# Patient Record
Sex: MALE | Race: BLACK OR AFRICAN AMERICAN | Employment: FULL TIME | URBAN - METROPOLITAN AREA
[De-identification: names, ages, dates, MRNs, and addresses within clinical notes are randomized per-mention and may not be internally consistent; named-entity substitution may affect disease eponyms.]

---

## 2018-07-22 ENCOUNTER — APPOINTMENT (OUTPATIENT)
Dept: GENERAL RADIOLOGY | Age: 28
End: 2018-07-22

## 2018-07-22 ENCOUNTER — HOSPITAL ENCOUNTER (EMERGENCY)
Age: 28
Discharge: HOME | End: 2018-07-22

## 2018-07-22 VITALS
SYSTOLIC BLOOD PRESSURE: 147 MMHG | WEIGHT: 173 LBS | RESPIRATION RATE: 18 BRPM | HEIGHT: 67 IN | DIASTOLIC BLOOD PRESSURE: 82 MMHG | OXYGEN SATURATION: 98 % | HEART RATE: 75 BPM | BODY MASS INDEX: 27.15 KG/M2 | TEMPERATURE: 97.8 F

## 2018-07-22 DIAGNOSIS — R06.02 SHORTNESS OF BREATH: ICD-10-CM

## 2018-07-22 DIAGNOSIS — J45.901 EXACERBATION OF PERSISTENT ASTHMA, UNSPECIFIED ASTHMA SEVERITY: Primary | ICD-10-CM

## 2018-07-22 PROCEDURE — 94664 DEMO&/EVAL PT USE INHALER: CPT

## 2018-07-22 PROCEDURE — 94645 CONT INHLJ TX EACH ADDL HOUR: CPT

## 2018-07-22 PROCEDURE — 94644 CONT INHLJ TX 1ST HOUR: CPT

## 2018-07-22 PROCEDURE — 99284 EMERGENCY DEPT VISIT MOD MDM: CPT

## 2018-07-22 PROCEDURE — 6370000000 HC RX 637 (ALT 250 FOR IP): Performed by: NURSE PRACTITIONER

## 2018-07-22 PROCEDURE — 71046 X-RAY EXAM CHEST 2 VIEWS: CPT

## 2018-07-22 RX ORDER — PREDNISONE 20 MG/1
60 TABLET ORAL ONCE
Status: COMPLETED | OUTPATIENT
Start: 2018-07-22 | End: 2018-07-22

## 2018-07-22 RX ORDER — IPRATROPIUM BROMIDE AND ALBUTEROL SULFATE 2.5; .5 MG/3ML; MG/3ML
1 SOLUTION RESPIRATORY (INHALATION) ONCE
Status: COMPLETED | OUTPATIENT
Start: 2018-07-22 | End: 2018-07-22

## 2018-07-22 RX ORDER — PREDNISONE 10 MG/1
60 TABLET ORAL DAILY
Qty: 30 TABLET | Refills: 0 | Status: SHIPPED | OUTPATIENT
Start: 2018-07-22 | End: 2018-07-27

## 2018-07-22 RX ORDER — ALBUTEROL SULFATE 90 UG/1
2 AEROSOL, METERED RESPIRATORY (INHALATION) EVERY 4 HOURS PRN
Qty: 1 INHALER | Refills: 0 | Status: SHIPPED | OUTPATIENT
Start: 2018-07-22

## 2018-07-22 RX ADMIN — PREDNISONE 60 MG: 20 TABLET ORAL at 02:27

## 2018-07-22 RX ADMIN — IPRATROPIUM BROMIDE AND ALBUTEROL SULFATE 1 AMPULE: .5; 3 SOLUTION RESPIRATORY (INHALATION) at 02:00

## 2018-07-22 ASSESSMENT — ENCOUNTER SYMPTOMS
COUGH: 0
VOMITING: 0
ABDOMINAL PAIN: 0
NAUSEA: 0
WHEEZING: 1
SHORTNESS OF BREATH: 1
DIARRHEA: 0
BACK PAIN: 0
COLOR CHANGE: 0

## 2018-07-22 ASSESSMENT — PAIN DESCRIPTION - PAIN TYPE: TYPE: ACUTE PAIN

## 2018-07-22 ASSESSMENT — PAIN DESCRIPTION - LOCATION: LOCATION: BACK

## 2018-07-22 ASSESSMENT — PAIN SCALES - GENERAL: PAINLEVEL_OUTOF10: 7

## 2018-07-22 NOTE — ED PROVIDER NOTES
pain, cough or congestion. Cardiovascular: Negative for chest pain. Gastrointestinal: Negative for abdominal pain, diarrhea, nausea and vomiting. Musculoskeletal: Negative for back pain. Skin: Negative for color change. Neurological: Negative for weakness and headaches. Positives and Pertinent negatives as per HPI. Except as noted above in the ROS, problem specific ROS was completed and is negative. Physical Exam:  Physical Exam   Constitutional: He is oriented to person, place, and time. He appears well-developed and well-nourished. HENT:   Head: Normocephalic. Right Ear: External ear normal.   Left Ear: External ear normal.   Mouth/Throat: Oropharynx is clear and moist.   Eyes: Right eye exhibits no discharge. Left eye exhibits no discharge. No scleral icterus. Neck: Normal range of motion. Neck supple. Cardiovascular: Normal rate and intact distal pulses. Pulmonary/Chest: Effort normal. No respiratory distress. He has wheezes. Airway patent with symmetric rise and fall chest.  Patient has inspiratory wheezing in the posterior upper lobes. Right posterior base has very faint crackles. He is slightly tachypneic but has no dyspnea and saturations are 96% on room air. Abdominal: Soft. Bowel sounds are normal. There is no tenderness. Musculoskeletal: Normal range of motion. Neurological: He is alert and oriented to person, place, and time. GCS eye subscore is 4. GCS verbal subscore is 5. GCS motor subscore is 6. Skin: Skin is warm and dry. He is not diaphoretic. No pallor. Psychiatric: He has a normal mood and affect. His behavior is normal.   Nursing note and vitals reviewed.       MEDICAL DECISION MAKING    Vitals:    Vitals:    07/22/18 0146 07/22/18 0206   BP: (!) 154/90    Pulse: 74    Resp: 24 24   Temp: 97.8 °F (36.6 °C)    TempSrc: Oral    SpO2: 96% 97%   Weight: 173 lb (78.5 kg)    Height: 5' 7\" (1.702 m)        LABS: Labs Reviewed - No data to display     Remainder

## 2018-07-22 NOTE — ED NOTES
Pt ok to d/c to home. Pt given d/c instructions. Pt verbalized understating including Rx and follow up care. Pt ambulated to [facility] for ride home.  0 s/s of distress at time of d/c.          Todd Givens RN  07/22/18 4201

## 2021-04-12 ENCOUNTER — HOSPITAL ENCOUNTER (EMERGENCY)
Age: 31
Discharge: HOME | End: 2021-04-12
Attending: EMERGENCY MEDICINE
Payer: COMMERCIAL

## 2021-04-12 VITALS
OXYGEN SATURATION: 98 % | HEART RATE: 76 BPM | DIASTOLIC BLOOD PRESSURE: 79 MMHG | SYSTOLIC BLOOD PRESSURE: 139 MMHG | TEMPERATURE: 98.4 F | RESPIRATION RATE: 16 BRPM

## 2021-04-12 DIAGNOSIS — M43.6 TORTICOLLIS: ICD-10-CM

## 2021-04-12 DIAGNOSIS — M54.2 NECK PAIN: Primary | ICD-10-CM

## 2021-04-12 PROCEDURE — 6360000002 HC RX W HCPCS: Performed by: EMERGENCY MEDICINE

## 2021-04-12 PROCEDURE — 99284 EMERGENCY DEPT VISIT MOD MDM: CPT

## 2021-04-12 PROCEDURE — 6370000000 HC RX 637 (ALT 250 FOR IP): Performed by: EMERGENCY MEDICINE

## 2021-04-12 PROCEDURE — 96372 THER/PROPH/DIAG INJ SC/IM: CPT

## 2021-04-12 RX ORDER — NAPROXEN 500 MG/1
500 TABLET ORAL 2 TIMES DAILY WITH MEALS
Qty: 30 TABLET | Refills: 0 | Status: ON HOLD | OUTPATIENT
Start: 2021-04-12 | End: 2021-12-11 | Stop reason: HOSPADM

## 2021-04-12 RX ORDER — DIAZEPAM 5 MG/1
5 TABLET ORAL EVERY 8 HOURS PRN
Qty: 15 TABLET | Refills: 0 | Status: SHIPPED | OUTPATIENT
Start: 2021-04-12 | End: 2021-04-17

## 2021-04-12 RX ORDER — KETOROLAC TROMETHAMINE 30 MG/ML
30 INJECTION, SOLUTION INTRAMUSCULAR; INTRAVENOUS ONCE
Status: COMPLETED | OUTPATIENT
Start: 2021-04-12 | End: 2021-04-12

## 2021-04-12 RX ORDER — LIDOCAINE 4 G/G
1 PATCH TOPICAL DAILY
Status: DISCONTINUED | OUTPATIENT
Start: 2021-04-12 | End: 2021-04-12 | Stop reason: HOSPADM

## 2021-04-12 RX ORDER — DIAZEPAM 5 MG/1
5 TABLET ORAL ONCE
Status: COMPLETED | OUTPATIENT
Start: 2021-04-12 | End: 2021-04-12

## 2021-04-12 RX ADMIN — DIAZEPAM 5 MG: 5 TABLET ORAL at 19:05

## 2021-04-12 RX ADMIN — KETOROLAC TROMETHAMINE 30 MG: 30 INJECTION, SOLUTION INTRAMUSCULAR at 19:04

## 2021-04-12 ASSESSMENT — PAIN SCALES - GENERAL: PAINLEVEL_OUTOF10: 10

## 2021-04-12 NOTE — ED NOTES
Pt discharged to home. Pt given discharge instructions and verbalized understanding. Pt ambulatory at discharge and left without incident.       Ryan Pimentel, RN  04/12/21 5285

## 2021-04-12 NOTE — ED PROVIDER NOTES
[address] [facility] ENCOUNTER          ATTENDING PHYSICIAN NOTE       Date of evaluation: 4/12/2021    Chief Complaint     Neck Pain (sudden onset of neck pain x1 hr ago denies injury or trauma)      History of Present Illness     Maki Zaldivar is a 31 y.o. male with a PMH as described below who presents to the ED today with a chief complaint of: Who presents with left-sided neck pain. Patient states that happened about an hour ago. He states that this has happened before. He notes that he does a lot of heavy lifting for his job at work. He is supposed to work this evening. He complains of pain along the left side at the base of his skull that radiates down along his upper back and his left shoulder and down his back. He denies any anterior chest pain, trouble breathing, or midline back pain. He denies any weakness, numbness, or tingling radiating down his arm. He has not take any medication for the symptoms. He denies any right-sided symptoms. He denies any headache, dizziness, lightheadedness. The patientstates that there were no other associated signs and symptoms or modifying factors. Patient rates pain as 10/10. Review of Systems     As described above in the HPI otherwise all the systems reviewed and negative as reported by the patient. Past Medical, Surgical, Family, and Social History     He has a past medical history of Asthma. He has no past surgical history on file. His family history is not on file. He reports that he has been smoking cigarettes. He has never used smokeless tobacco. He reports that he does not drink alcohol. Medications     Previous Medications    ALBUTEROL SULFATE HFA (PROVENTIL HFA) 108 (90 BASE) MCG/ACT INHALER    Inhale 2 puffs into the lungs every 4 hours as needed for Wheezing or Shortness of Breath (Space out to every 6 hours as symptoms improve) Space out to every 6 hours as symptoms improve.        Allergies     He has No Known Allergies. Physical Exam     INITIAL VITALS: BP: 139/79, Temp: 98.4 °F (36.9 °C), Pulse: 76, Resp: 16, SpO2: 98 %   Physical Exam  Vitals signs and nursing note reviewed. Constitutional:       Appearance: Normal appearance. He is normal weight. HENT:      Head: Normocephalic and atraumatic. Mouth/Throat:      Mouth: Mucous membranes are moist.   Eyes:      Extraocular Movements: Extraocular movements intact. Pupils: Pupils are equal, round, and reactive to light. Neck:      Comments: Reproducible tenderness along the left occipital rim with palpable spasm of the left trapezius. Patient has full strength and sensation in his left upper extremity but does have exacerbation of pain with elevation of his left arm. There is additional left-sided paraspinal cervical tenderness. Pain with rotation of his head and flexion and extension of his neck. No nuchal rigidity or meningismus. Cardiovascular:      Rate and Rhythm: Normal rate and regular rhythm. Pulmonary:      Effort: Pulmonary effort is normal.      Breath sounds: Normal breath sounds. Musculoskeletal: Normal range of motion. General: Tenderness (Left-sided cervical tenderness as described above) present. No swelling. Skin:     Capillary Refill: Capillary refill takes less than 2 seconds. Neurological:      General: No focal deficit present. Mental Status: He is alert and oriented to person, place, and time. Mental status is at baseline. Psychiatric:         Mood and Affect: Mood normal.         DiagnosticResults     EKG       RADIOLOGY:  No orders to display       LABS:   No results found for this visit on 04/12/21. ED BEDSIDE ULTRASOUND:      RECENT VITALS:  BP: 139/79, Temp: 98.4 °F (36.9 °C),Pulse: 76, Resp: 16, SpO2: 98 %     Procedures         ED Course     Nursing Notes, Past Medical Hx, Past Surgical Hx, Social Hx, Allergies, and Family Hx werereviewed.     The patient was given thefollowing medications:  Orders Placed This Encounter   Medications    ketorolac (TORADOL) injection 30 mg    diazePAM (VALIUM) tablet 5 mg    lidocaine 4 % external patch 1 patch    naproxen (NAPROSYN) 500 MG tablet     Sig: Take 1 tablet by mouth 2 times daily (with meals)     Dispense:  30 tablet     Refill:  0    diazePAM (VALIUM) 5 MG tablet     Sig: Take 1 tablet by mouth every 8 hours as needed (muscle spasm) for up to 5 days. Dispense:  15 tablet     Refill:  0       CONSULTS:  None    MEDICAL DECISION MAKING / ASSESSMENT / Ginger Hyun is a 31 y.o. male who presents with reproducible left-sided neck pain. Symptoms are suspicious for torticollis likely secondary to trapezius spasm. He has reproducible pain in this distribution. He has no radicular features and is otherwise neurovascularly intact in his left upper extremity. He does heavy lifting for his job at work. No indication for cross-sectional imaging is no obvious or blunt trauma. He was given medications listed above with modest improvement in his symptoms. Ultimately I believe that he is safe for discharge home. I will discharge him home with a prescription for muscle relaxers, anti-inflammatory medication and he was instructed to use over-the-counter Lidoderm patches. He was given a short work note. Home stretching exercises provided. .        Clinical Impression     1. Neck pain    2. Torticollis        Disposition     PATIENT REFERRED TO:  Cassie Chen MD  [facility] [address] [phone]            DISCHARGE MEDICATIONS:  New Prescriptions    DIAZEPAM (VALIUM) 5 MG TABLET    Take 1 tablet by mouth every 8 hours as needed (muscle spasm) for up to 5 days.     NAPROXEN (NAPROSYN) 500 MG TABLET    Take 1 tablet by mouth 2 times daily (with meals)       DISPOSITION Discharge - Pending Orders Complete 04/12/2021 07:18:20 PM         Vanda Liu MD  04/12/21 0576

## 2021-12-09 ENCOUNTER — APPOINTMENT (OUTPATIENT)
Dept: CT IMAGING | Age: 31
DRG: 066 | End: 2021-12-09
Payer: COMMERCIAL

## 2021-12-09 ENCOUNTER — HOSPITAL ENCOUNTER (INPATIENT)
Age: 31
LOS: 1 days | Discharge: HOME | DRG: 066 | End: 2021-12-11
Attending: EMERGENCY MEDICINE | Admitting: INTERNAL MEDICINE
Payer: COMMERCIAL

## 2021-12-09 DIAGNOSIS — H81.4 CENTRAL POSITIONAL VERTIGO: ICD-10-CM

## 2021-12-09 DIAGNOSIS — R42 VERTIGO: Primary | ICD-10-CM

## 2021-12-09 LAB
BASOPHILS ABSOLUTE: 0.1 K/UL (ref 0–0.2)
BASOPHILS RELATIVE PERCENT: 1.5 %
EOSINOPHILS ABSOLUTE: 0.2 K/UL (ref 0–0.6)
EOSINOPHILS RELATIVE PERCENT: 5.2 %
HCT VFR BLD CALC: 47.7 % (ref 40.5–52.5)
HEMOGLOBIN: 15.9 G/DL (ref 13.5–17.5)
LYMPHOCYTES ABSOLUTE: 2 K/UL (ref 1–5.1)
LYMPHOCYTES RELATIVE PERCENT: 44.7 %
MCH RBC QN AUTO: 30.2 PG (ref 26–34)
MCHC RBC AUTO-ENTMCNC: 33.3 G/DL (ref 31–36)
MCV RBC AUTO: 90.7 FL (ref 80–100)
MONOCYTES ABSOLUTE: 0.5 K/UL (ref 0–1.3)
MONOCYTES RELATIVE PERCENT: 11.6 %
NEUTROPHILS ABSOLUTE: 1.7 K/UL (ref 1.7–7.7)
NEUTROPHILS RELATIVE PERCENT: 37 %
PDW BLD-RTO: 13.2 % (ref 12.4–15.4)
PLATELET # BLD: 284 K/UL (ref 135–450)
PLATELET SLIDE REVIEW: ADEQUATE
PMV BLD AUTO: 8.6 FL (ref 5–10.5)
RBC # BLD: 5.26 M/UL (ref 4.2–5.9)
WBC # BLD: 4.6 K/UL (ref 4–11)

## 2021-12-09 PROCEDURE — 96374 THER/PROPH/DIAG INJ IV PUSH: CPT

## 2021-12-09 PROCEDURE — 6360000002 HC RX W HCPCS: Performed by: EMERGENCY MEDICINE

## 2021-12-09 PROCEDURE — 85025 COMPLETE CBC W/AUTO DIFF WBC: CPT

## 2021-12-09 PROCEDURE — 70450 CT HEAD/BRAIN W/O DYE: CPT

## 2021-12-09 PROCEDURE — 70496 CT ANGIOGRAPHY HEAD: CPT

## 2021-12-09 PROCEDURE — 93005 ELECTROCARDIOGRAM TRACING: CPT

## 2021-12-09 PROCEDURE — 4A03X5D MEASUREMENT OF ARTERIAL FLOW, INTRACRANIAL, EXTERNAL APPROACH: ICD-10-PCS | Performed by: RADIOLOGY

## 2021-12-09 PROCEDURE — 2580000003 HC RX 258: Performed by: EMERGENCY MEDICINE

## 2021-12-09 PROCEDURE — 80053 COMPREHEN METABOLIC PANEL: CPT

## 2021-12-09 PROCEDURE — 99285 EMERGENCY DEPT VISIT HI MDM: CPT

## 2021-12-09 PROCEDURE — 96375 TX/PRO/DX INJ NEW DRUG ADDON: CPT

## 2021-12-09 RX ORDER — ONDANSETRON 2 MG/ML
4 INJECTION INTRAMUSCULAR; INTRAVENOUS ONCE
Status: COMPLETED | OUTPATIENT
Start: 2021-12-09 | End: 2021-12-09

## 2021-12-09 RX ORDER — MECLIZINE HCL 12.5 MG/1
12.5 TABLET ORAL 3 TIMES DAILY PRN
Status: ON HOLD | COMMUNITY
End: 2021-12-11 | Stop reason: HOSPADM

## 2021-12-09 RX ORDER — 0.9 % SODIUM CHLORIDE 0.9 %
1000 INTRAVENOUS SOLUTION INTRAVENOUS ONCE
Status: COMPLETED | OUTPATIENT
Start: 2021-12-09 | End: 2021-12-10

## 2021-12-09 RX ORDER — KETOROLAC TROMETHAMINE 30 MG/ML
30 INJECTION, SOLUTION INTRAMUSCULAR; INTRAVENOUS ONCE
Status: COMPLETED | OUTPATIENT
Start: 2021-12-10 | End: 2021-12-10

## 2021-12-09 RX ORDER — LORAZEPAM 2 MG/ML
0.5 INJECTION INTRAMUSCULAR ONCE
Status: COMPLETED | OUTPATIENT
Start: 2021-12-09 | End: 2021-12-09

## 2021-12-09 RX ADMIN — ONDANSETRON 4 MG: 2 INJECTION INTRAMUSCULAR; INTRAVENOUS at 23:21

## 2021-12-09 RX ADMIN — SODIUM CHLORIDE 1000 ML: 9 INJECTION, SOLUTION INTRAVENOUS at 23:30

## 2021-12-09 RX ADMIN — LORAZEPAM 0.5 MG: 2 INJECTION INTRAMUSCULAR; INTRAVENOUS at 23:21

## 2021-12-09 ASSESSMENT — PAIN DESCRIPTION - DESCRIPTORS: DESCRIPTORS: ACHING

## 2021-12-09 ASSESSMENT — PAIN DESCRIPTION - FREQUENCY: FREQUENCY: CONTINUOUS

## 2021-12-09 ASSESSMENT — PAIN DESCRIPTION - PAIN TYPE: TYPE: ACUTE PAIN

## 2021-12-09 ASSESSMENT — PAIN DESCRIPTION - LOCATION: LOCATION: HEAD;EYE

## 2021-12-09 ASSESSMENT — PAIN SCALES - GENERAL: PAINLEVEL_OUTOF10: 8

## 2021-12-09 NOTE — LETTER
[address] [facility] [address]  Phone: [phone]          December 11, 2021     Patient: Martinez South   YOB: 1990   Date of Visit: 12/9/2021       To Whom It May Concern: It is my medical opinion that Martinez South should remain out of work until follow up with neuro-ophthalmology for visual defect. .    If you have any questions or concerns, please don't hesitate to call.     Sincerely,    Coby Bran MD

## 2021-12-09 NOTE — LETTER
[address] [facility] [address]  Phone: [phone]          December 11, 2021     Patient: Aishwarya Edwards   YOB: 1990   Date of Visit: 12/9/2021       To Whom It May Concern: It is my medical opinion that Aishwarya Edwards should remain out of work until follow up with neuro-ophthalmology for visual defect. .    If you have any questions or concerns, please don't hesitate to call.     Sincerely,    Ez Burton MD

## 2021-12-10 ENCOUNTER — APPOINTMENT (OUTPATIENT)
Dept: MRI IMAGING | Age: 31
DRG: 066 | End: 2021-12-10
Payer: COMMERCIAL

## 2021-12-10 PROBLEM — R42 VERTIGO: Status: ACTIVE | Noted: 2021-12-10

## 2021-12-10 LAB
A/G RATIO: 1.4 (ref 1.1–2.2)
ALBUMIN SERPL-MCNC: 4.8 G/DL (ref 3.4–5)
ALP BLD-CCNC: 50 U/L (ref 40–129)
ALT SERPL-CCNC: 13 U/L (ref 10–40)
ANION GAP SERPL CALCULATED.3IONS-SCNC: 10 MMOL/L (ref 3–16)
AST SERPL-CCNC: 21 U/L (ref 15–37)
BILIRUB SERPL-MCNC: 0.3 MG/DL (ref 0–1)
BUN BLDV-MCNC: 10 MG/DL (ref 7–20)
CALCIUM SERPL-MCNC: 10.1 MG/DL (ref 8.3–10.6)
CHLORIDE BLD-SCNC: 101 MMOL/L (ref 99–110)
CHOLESTEROL, TOTAL: 177 MG/DL (ref 0–199)
CO2: 29 MMOL/L (ref 21–32)
CREAT SERPL-MCNC: 1.1 MG/DL (ref 0.9–1.3)
GFR AFRICAN AMERICAN: >60
GFR NON-AFRICAN AMERICAN: >60
GLUCOSE BLD-MCNC: 104 MG/DL (ref 70–99)
HDLC SERPL-MCNC: 36 MG/DL (ref 40–60)
LDL CHOLESTEROL CALCULATED: 119 MG/DL
LV EF: 58 %
LVEF MODALITY: NORMAL
POTASSIUM SERPL-SCNC: 3.9 MMOL/L (ref 3.5–5.1)
SARS-COV-2, NAAT: NOT DETECTED
SODIUM BLD-SCNC: 140 MMOL/L (ref 136–145)
TOTAL PROTEIN: 8.2 G/DL (ref 6.4–8.2)
TRIGL SERPL-MCNC: 111 MG/DL (ref 0–150)
VLDLC SERPL CALC-MCNC: 22 MG/DL

## 2021-12-10 PROCEDURE — 85613 RUSSELL VIPER VENOM DILUTED: CPT

## 2021-12-10 PROCEDURE — 6370000000 HC RX 637 (ALT 250 FOR IP): Performed by: NURSE PRACTITIONER

## 2021-12-10 PROCEDURE — 97530 THERAPEUTIC ACTIVITIES: CPT

## 2021-12-10 PROCEDURE — G0378 HOSPITAL OBSERVATION PER HR: HCPCS

## 2021-12-10 PROCEDURE — 83036 HEMOGLOBIN GLYCOSYLATED A1C: CPT

## 2021-12-10 PROCEDURE — 6370000000 HC RX 637 (ALT 250 FOR IP): Performed by: INTERNAL MEDICINE

## 2021-12-10 PROCEDURE — 97116 GAIT TRAINING THERAPY: CPT

## 2021-12-10 PROCEDURE — 1200000000 HC SEMI PRIVATE

## 2021-12-10 PROCEDURE — 97165 OT EVAL LOW COMPLEX 30 MIN: CPT

## 2021-12-10 PROCEDURE — APPNB60 APP NON BILLABLE TIME 46-60 MINS: Performed by: NURSE PRACTITIONER

## 2021-12-10 PROCEDURE — C8929 TTE W OR WO FOL WCON,DOPPLER: HCPCS

## 2021-12-10 PROCEDURE — 36415 COLL VENOUS BLD VENIPUNCTURE: CPT

## 2021-12-10 PROCEDURE — 81400 MOPATH PROCEDURE LEVEL 1: CPT

## 2021-12-10 PROCEDURE — 81241 F5 GENE: CPT

## 2021-12-10 PROCEDURE — 97162 PT EVAL MOD COMPLEX 30 MIN: CPT

## 2021-12-10 PROCEDURE — 86146 BETA-2 GLYCOPROTEIN ANTIBODY: CPT

## 2021-12-10 PROCEDURE — 80061 LIPID PANEL: CPT

## 2021-12-10 PROCEDURE — 96376 TX/PRO/DX INJ SAME DRUG ADON: CPT

## 2021-12-10 PROCEDURE — 81240 F2 GENE: CPT

## 2021-12-10 PROCEDURE — 6360000002 HC RX W HCPCS: Performed by: EMERGENCY MEDICINE

## 2021-12-10 PROCEDURE — 96375 TX/PRO/DX INJ NEW DRUG ADDON: CPT

## 2021-12-10 PROCEDURE — 86147 CARDIOLIPIN ANTIBODY EA IG: CPT

## 2021-12-10 PROCEDURE — 81291 MTHFR GENE: CPT

## 2021-12-10 PROCEDURE — 2580000003 HC RX 258: Performed by: INTERNAL MEDICINE

## 2021-12-10 PROCEDURE — 85598 HEXAGNAL PHOSPH PLTLT NEUTRL: CPT

## 2021-12-10 PROCEDURE — 85730 THROMBOPLASTIN TIME PARTIAL: CPT

## 2021-12-10 PROCEDURE — 87635 SARS-COV-2 COVID-19 AMP PRB: CPT

## 2021-12-10 PROCEDURE — 96374 THER/PROPH/DIAG INJ IV PUSH: CPT

## 2021-12-10 PROCEDURE — 96372 THER/PROPH/DIAG INJ SC/IM: CPT

## 2021-12-10 PROCEDURE — 6360000002 HC RX W HCPCS: Performed by: INTERNAL MEDICINE

## 2021-12-10 RX ORDER — ACETAMINOPHEN 650 MG/1
650 SUPPOSITORY RECTAL EVERY 6 HOURS PRN
Status: DISCONTINUED | OUTPATIENT
Start: 2021-12-10 | End: 2021-12-11 | Stop reason: HOSPADM

## 2021-12-10 RX ORDER — ONDANSETRON 4 MG/1
4 TABLET, ORALLY DISINTEGRATING ORAL EVERY 8 HOURS PRN
Status: DISCONTINUED | OUTPATIENT
Start: 2021-12-10 | End: 2021-12-11 | Stop reason: HOSPADM

## 2021-12-10 RX ORDER — SODIUM CHLORIDE 9 MG/ML
INJECTION, SOLUTION INTRAVENOUS CONTINUOUS
Status: DISCONTINUED | OUTPATIENT
Start: 2021-12-10 | End: 2021-12-10

## 2021-12-10 RX ORDER — SODIUM CHLORIDE 9 MG/ML
25 INJECTION, SOLUTION INTRAVENOUS PRN
Status: DISCONTINUED | OUTPATIENT
Start: 2021-12-10 | End: 2021-12-11 | Stop reason: HOSPADM

## 2021-12-10 RX ORDER — DIAZEPAM 5 MG/1
5 TABLET ORAL ONCE
Status: COMPLETED | OUTPATIENT
Start: 2021-12-10 | End: 2021-12-10

## 2021-12-10 RX ORDER — SODIUM CHLORIDE 0.9 % (FLUSH) 0.9 %
5-40 SYRINGE (ML) INJECTION EVERY 12 HOURS SCHEDULED
Status: DISCONTINUED | OUTPATIENT
Start: 2021-12-10 | End: 2021-12-11 | Stop reason: HOSPADM

## 2021-12-10 RX ORDER — MECLIZINE HYDROCHLORIDE 25 MG/1
12.5 TABLET ORAL 3 TIMES DAILY PRN
Status: DISCONTINUED | OUTPATIENT
Start: 2021-12-10 | End: 2021-12-11 | Stop reason: HOSPADM

## 2021-12-10 RX ORDER — POLYETHYLENE GLYCOL 3350 17 G/17G
17 POWDER, FOR SOLUTION ORAL DAILY PRN
Status: DISCONTINUED | OUTPATIENT
Start: 2021-12-10 | End: 2021-12-11 | Stop reason: HOSPADM

## 2021-12-10 RX ORDER — ACETAMINOPHEN 325 MG/1
650 TABLET ORAL EVERY 6 HOURS PRN
Status: DISCONTINUED | OUTPATIENT
Start: 2021-12-10 | End: 2021-12-11 | Stop reason: HOSPADM

## 2021-12-10 RX ORDER — ONDANSETRON 2 MG/ML
4 INJECTION INTRAMUSCULAR; INTRAVENOUS EVERY 6 HOURS PRN
Status: DISCONTINUED | OUTPATIENT
Start: 2021-12-10 | End: 2021-12-11 | Stop reason: HOSPADM

## 2021-12-10 RX ORDER — SODIUM CHLORIDE 0.9 % (FLUSH) 0.9 %
5-40 SYRINGE (ML) INJECTION PRN
Status: DISCONTINUED | OUTPATIENT
Start: 2021-12-10 | End: 2021-12-11 | Stop reason: HOSPADM

## 2021-12-10 RX ADMIN — ENOXAPARIN SODIUM 40 MG: 100 INJECTION SUBCUTANEOUS at 09:22

## 2021-12-10 RX ADMIN — DIAZEPAM 5 MG: 5 TABLET ORAL at 12:11

## 2021-12-10 RX ADMIN — ONDANSETRON 4 MG: 4 TABLET, ORALLY DISINTEGRATING ORAL at 12:11

## 2021-12-10 RX ADMIN — ONDANSETRON 4 MG: 2 INJECTION INTRAMUSCULAR; INTRAVENOUS at 18:39

## 2021-12-10 RX ADMIN — ASPIRIN 325 MG: 325 TABLET, COATED ORAL at 13:43

## 2021-12-10 RX ADMIN — SODIUM CHLORIDE, PRESERVATIVE FREE 10 ML: 5 INJECTION INTRAVENOUS at 21:33

## 2021-12-10 RX ADMIN — KETOROLAC TROMETHAMINE 30 MG: 30 INJECTION, SOLUTION INTRAMUSCULAR; INTRAVENOUS at 00:17

## 2021-12-10 RX ADMIN — SODIUM CHLORIDE: 9 INJECTION, SOLUTION INTRAVENOUS at 07:17

## 2021-12-10 ASSESSMENT — PAIN SCALES - GENERAL
PAINLEVEL_OUTOF10: 0
PAINLEVEL_OUTOF10: 5
PAINLEVEL_OUTOF10: 0
PAINLEVEL_OUTOF10: 0

## 2021-12-10 NOTE — PROGRESS NOTES
Occupational Therapy   Occupational Therapy Initial Assessment and Tx  Date: 12/10/2021   Patient Name: Gil Brown  MRN: [id number]     : 1990    Date of Service: 12/10/2021    Discharge Recommendations:  Gil Brown scored a 19/24 on the AM-PAC ADL Inpatient form. Current research shows that an AM-PAC score of 18 or greater is typically associated with a discharge to the patient's home setting. Based on the patient's AM-PAC score, and their current ADL deficits, it is recommended that the patient have 2-3 sessions per week of Occupational Therapy at d/c to increase the patient's independence. At this time, this patient demonstrates the endurance and safety to discharge home with (home vs OP services) and a follow up treatment frequency of 2-3x/wk. Please see assessment section for further patient specific details. If patient discharges prior to next session this note will serve as a discharge summary. Please see below for the latest assessment towards goals. OT Equipment Recommendations  Equipment Needed: No    Assessment   Performance deficits / Impairments: Decreased functional mobility ; Decreased ADL status; Decreased endurance; Decreased balance  Assessment: Pt from home, prev IND, admit with vertigo. Pt with significantly decreased balance. Pt multiple LOB this date 2-3x, Carolann x2 to [address] [place] to correct. Completing bed to chair, steps fwd and back from recliner 6-8 steps w/o AD. Significant difficulty with turning. Pt limited eval 2/2 balance and vertigo. Completing ADLs seated in recliner chair. Nystagmus noted at rest, noted with visual tracking. Would benefit from cont OT while lesli acute care.   Treatment Diagnosis: impaired mobility, transfers, ADL  Decision Making: Low Complexity  OT Education: OT Role; Plan of Care; ADL Adaptive Strategies; Transfer Training  Patient Education: verb understanding  Barriers to Learning: none  REQUIRES OT FOLLOW UP: Yes  Activity Tolerance  Activity Tolerance: Treatment limited secondary to medical complications (free text)  Activity Tolerance: limited by vertigo  Safety Devices  Safety Devices in place: Yes  Type of devices: All fall risk precautions in place; Call light within reach; Chair alarm in place; Gait belt; Patient at risk for falls; Left in chair; Nurse notified           Patient Diagnosis(es): The encounter diagnosis was Vertigo. has a past medical history of Asthma and Nystagmus. has no past surgical history on file. Treatment Diagnosis: impaired mobility, transfers, ADL      Restrictions  Position Activity Restriction  Other position/activity restrictions: up as tolerated, up with assist, fall precautions    Subjective   General  Chart Reviewed: Yes  Additional Pertinent Hx: 36 y.o. male who presents via ambulance to the ED with complaints of pain is described as a spinning sensation and feels off balance. He states that it came on abruptly Tuesday night. Referring Practitioner: Lydia Franco MD  Diagnosis: vertigo  Subjective  Subjective:  In bed upon arrival, agreeable to session  Patient Currently in Pain: Denies  Vital Signs  Level of Consciousness: Alert (0)  Patient Currently in Pain: Denies  Social/Functional History  Social/Functional History  Lives With: Family (mom, post CVA but gets around good now)  Type of Home: Apartment  Home Layout: One level  Home Access: Stairs to enter with rails  Entrance Stairs - Number of Steps: 3+3  Entrance Stairs - Rails: Right  Bathroom Shower/Tub: Tub/Shower unit  Bathroom Toilet: Standard  ADL Assistance: Independent  Homemaking Assistance: Independent  Homemaking Responsibilities: Yes  Ambulation Assistance: Independent  Transfer Assistance: Independent  Active : No  Patient's  Info: takes bus  Occupation: Full time employment  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 pt reports seeing double vision from 2 ft away  Balance  Sitting Balance: Supervision  Standing Balance: Minimal assistance  Standing Balance  Activity: bed to chair, amb fwd and back from chair 6-8 steps no AD  Functional Mobility  Functional - Mobility Device: No device  Activity: Other  Functional Mobility Comments: Carolann x2 to [address] [place] x1 ambulating, LOB 2-3x.  Vertigo, multiple LOB, unsteady  ADL  Grooming: Setup; Stand by assistance (seated in recliner chair, wipe face, oral care)  LE Dressing: Stand by assistance (jemma socks seated EOB)  Toileting:  (declined need)        Bed mobility  Supine to Sit: Stand by assistance  Scooting: Stand by assistance  Transfers  Sit to stand: Contact guard assistance  Stand to sit: Contact guard assistance     Cognition  Overall Cognitive Status: WNL                 LUE AROM (degrees)  LUE AROM : WFL  Left Hand AROM (degrees)  Left Hand AROM: WFL  RUE AROM (degrees)  RUE AROM : WFL  Right Hand AROM (degrees)  Right Hand AROM: WFL  LUE Strength  Gross LUE Strength: WFL  RUE Strength  Gross RUE Strength: WFL                   Plan   Plan  Times per week: 2-5  Times per day: Daily      AM-PAC Score        AM-[facility] Inpatient Daily Activity Raw Score: 19 (12/10/21 1330)  AM-PAC Inpatient ADL T-Scale Score : 40.22 (12/10/21 1330)  ADL Inpatient CMS 0-100% Score: 42.8 (12/10/21 1330)  ADL Inpatient CMS G-Code Modifier : CK (12/10/21 1330)    Goals  Short term goals  Time Frame for Short term goals: dc  Short term goal 1: supine to sit IND  Short term goal 2: sit<>stand SPVN  Short term goal 3: Fx mobility SBA w/ LRAD  Short term goal 4: LB dressing SPVN  Short term goal 5: tolerate toileting assessment       Therapy Time   Individual Concurrent Group Co-treatment   Time In 1110 Time Out 1135         Minutes 25              Timed Code Treatment Minutes:   15    Total Treatment Minutes:  [address], OT

## 2021-12-10 NOTE — CARE COORDINATION
CM following for  D/c planning: patient from home w/ spouse  indep at  Baseline pta:  Presented  With Vertigo difficulty walking imbalance: No current services or DME  . Neurosurgery consulted    Work up in process:    Consult placed to PMR:    CM will cont to follow:    Electronically signed by Jt Espinoza RN on 12/10/2021 at 1:27 PM         Jt Espinoza RN Case Manager  [facility]  [address] [place] [address].   [facility] [address] [phone]  Fax [phone]

## 2021-12-10 NOTE — H&P
Hospital Medicine History & Physical      PCP: Alfred Valdes MD    Date of Admission: 12/9/2021    Date of Service: Pt seen/examined on 12/10/2021 and Admitted to Inpatient with expected LOS greater than two midnights due to medical therapy needed for acute vertiginous state, at high risk for complications. Placed in Observation. Chief Complaint:    Dizziness    History Of Present Illness:   Pt s a 37 yo male w/ no significant pmh who presents w/ a two day hx of sudden onset of dizziness, lightheadedness, and room spinning sensation. Pt reports nausea w/o vomiting. Denies headache. No aggravating factors- whether lying flat or moving pt notes no difference in sx. In the ed he received ativan which he felt helped. He also reports improvement when he looks to his far right. Denies chest pain/ sob/ headache. No focal weakness. No recent trauma. No recent illness. No fever/ chills/ cough/ congestion. Past Medical History:          Diagnosis Date    Asthma     Nystagmus      Past Surgical History:      History reviewed. No pertinent surgical history. Medications Prior to Admission:      Prior to Admission medications    Medication Sig Start Date End Date Taking? Authorizing Provider   meclizine (ANTIVERT) 12.5 MG tablet Take 12.5 mg by mouth 3 times daily as needed   Yes Historical Provider, MD   naproxen (NAPROSYN) 500 MG tablet Take 1 tablet by mouth 2 times daily (with meals) 4/12/21   Sydnee Vincent MD   albuterol sulfate HFA (PROVENTIL HFA) 108 (90 Base) MCG/ACT inhaler Inhale 2 puffs into the lungs every 4 hours as needed for Wheezing or Shortness of Breath (Space out to every 6 hours as symptoms improve) Space out to every 6 hours as symptoms improve.  7/22/18   Corinne Maurer, TRA - CNP       Allergies:  Shellfish-derived products and Percocet [oxycodone-acetaminophen]    Social History:      The patient currently lives at home    TOBACCO:   reports that he has been smoking cigarettes. He has never used smokeless tobacco.  ETOH:   reports no history of alcohol use. E-Cigarettes/Vaping Use     Questions Responses    E-Cigarette/Vaping Use Never User    Start Date     Passive Exposure     Quit Date     Counseling Given     Comments             Family History:    CAD- mother  CVA- paternal grandmother  DM2- sibling    REVIEW OF SYSTEMS COMPLETED:   Pertinent positives as noted in the HPI. All other systems reviewed and negative. PHYSICAL EXAM PERFORMED:    /69   Pulse 75   Temp 97.8 °F (36.6 °C) (Oral)   Resp 18   Ht 5' 7\" (1.702 m)   Wt 189 lb (85.7 kg)   SpO2 94%   BMI 29.60 kg/m²     General appearance:  Appears in discomfort 2/2 dizziness  HEENT:  Left lateral nystagmus noted, eomi  Neck: Supple, with full range of motion. Respiratory:  Normal respiratory effort. Clear to auscultation, bilaterally without Rales/Wheezes/Rhonchi. Cardiovascular:  Regular rate and rhythm with normal S1/S2 without murmurs, rubs or gallops. Abdomen: Soft, non-tender  Musculoskeletal:  No clubbing, cyanosis or edema bilaterally. Full range of motion without deformity. Skin: Skin color, texture, turgor normal.  No rashes or lesions. Neurologic:  Neurovascularly intact without any focal sensory/motor deficits. Cranial nerves: II-XII intact, grossly non-focal.  Psychiatric:  Alert and oriented, thought content appropriate, normal insight  Capillary Refill: Brisk,3 seconds, normal  Peripheral Pulses: +2 palpable, equal bilaterally     Labs:     Recent Labs     12/09/21  2329   WBC 4.6   HGB 15.9   HCT 47.7        Recent Labs     12/09/21  2329      K 3.9      CO2 29   BUN 10   CREATININE 1.1   CALCIUM 10.1     Recent Labs     12/09/21  2329   AST 21   ALT 13   BILITOT 0.3   ALKPHOS 50     Radiology:       CTA HEAD NECK W CONTRAST   Final Result      CTA Head:   No significant arterial steno-occlusive disease or evidence of aneurysm.       CTA Neck:   No significant arterial steno-occlusive disease or evidence of dissection. CT HEAD WO CONTRAST   Final Result      No acute intracranial hemorrhage or mass effect          ASSESSMENT and PLAN:    Active Hospital Problems    Diagnosis Date Noted    Vertigo [R42] 12/10/2021   w/ left lateral nystagmus. Peripheral causes include bppv, meniere's, but need to exclude central causes. Thus far cta head and neck negative. Will proceed w/ mri of the brain and will consult neuro for further eval.  PT/OT eval.  Meclizine ineffective. Trial of valium. DVT Prophylaxis: Lovenox  Diet: ADULT DIET; Regular  Code Status: Full Code    PT/OT Eval Status: Wade Meyer MD       Thank you Regla Sue MD for the opportunity to be involved in this patient's care.

## 2021-12-10 NOTE — ED NOTES
Pt sleeping, arouses easily to verbal command, Aware ETA approx 3000 [facility], RN  12/10/21 7739

## 2021-12-10 NOTE — CONSULTS
Hematology Consult Note  PGY-2    PCP: Laurinda Hatchet, MD    Code:Full Code  Admit Date: 12/9/2021  Diet: ADULT DIET; Regular      History of present illness:      CC: Mila    Patient is a 36 y.o. male with a no significant past medical history who presents with acute onset of difficultly walking which developed four days ago. Associated with this he felt really nauseated and light-headed. He was dropping things on the ground frequently. He went to see the [facility] health nurse. He vomited on his way home.      He was sent to [facility] on Tuesday morning, and was diagnosed with vertigo. He slept all day. His symptoms are constant, but the queasiness comes and goes. The vertigo is unchanged since it began. This has never happened to him before.      He denies any diplopia but reports associated symptoms of blurry vision. He tells me if he looks to the right, it doesn't spin. He reports neck pain for the last month in his left posterior neck. No weakness, dysphagia, paresthesias.       He denies a history of hypertension. Does not take an antiplatelet at home. He denies a history of hyperlipidemia or diabetes, heart failure, DVT/PE. He smokes occasionally on weekends. He has not been vaccinated for COVID-19. He reports having COVID-19 about a month ago. ROS: Review of Systems -   All other systems reviewed and are negative. Past Medical / Surgical History:    Past Medical History:   Diagnosis Date    Asthma     Nystagmus      History reviewed. No pertinent surgical history. Medications Prior to Admission:    No current facility-administered medications on file prior to encounter.      Current Outpatient Medications on File Prior to Encounter   Medication Sig Dispense Refill    meclizine (ANTIVERT) 12.5 MG tablet Take 12.5 mg by mouth 3 times daily as needed      naproxen (NAPROSYN) 500 MG tablet Take 1 tablet by mouth 2 times daily (with meals) 30 tablet 0    albuterol sulfate HFA (PROVENTIL HFA) 108 (90 Base) MCG/ACT inhaler Inhale 2 puffs into the lungs every 4 hours as needed for Wheezing or Shortness of Breath (Space out to every 6 hours as symptoms improve) Space out to every 6 hours as symptoms improve. 1 Inhaler 0       Allergies:  Shellfish-derived products and Percocet [oxycodone-acetaminophen]    Social History:   TOBACCO:   reports that he has been smoking cigarettes. He has never used smokeless tobacco.       ETOH:   reports no history of alcohol use. Family History:   History reviewed. No pertinent family history. Vital/I&O/Physical examination:   VS:  /81   Pulse 67   Temp 98.5 °F (36.9 °C) (Oral)   Resp 18   Ht 5' 7\" (1.702 m)   Wt 189 lb (85.7 kg)   SpO2 95%   BMI 29.60 kg/m²     I/O:    Intake/Output Summary (Last 24 hours) at 12/10/2021 1800  Last data filed at 12/10/2021 1228  Gross per 24 hour   Intake 240 ml   Output 600 ml   Net -360 ml       PE:  Physical Exam  Vitals reviewed. Constitutional:       Comments: Appeared discomfort secondary to dizziness   HENT:      Head: Normocephalic and atraumatic. Nose: Nose normal.      Mouth/Throat:      Mouth: Mucous membranes are moist.   Eyes:      Extraocular Movements: Extraocular movements intact. Conjunctiva/sclera: Conjunctivae normal.      Pupils: Pupils are equal, round, and reactive to light. Cardiovascular:      Rate and Rhythm: Normal rate and regular rhythm. Pulses: Normal pulses. Heart sounds: Normal heart sounds. Pulmonary:      Effort: Pulmonary effort is normal.      Breath sounds: Normal breath sounds. Abdominal:      Palpations: Abdomen is soft. Musculoskeletal:         General: Normal range of motion. Cervical back: Normal range of motion and neck supple. Neurological:      General: No focal deficit present. Mental Status: He is alert and oriented to person, place, and time.            Labs & Imaging:   LABS:  CBC:   Recent Labs     12/09/21  2329   WBC 4.6   HGB 15.9   HCT 47.7      MCV 90.7                            Renal:   Recent Labs     12/09/21  2329      K 3.9      CO2 29   BUN 10   CREATININE 1.1   GLUCOSE 104*   ANIONGAP 10     Hepatic:   Recent Labs     12/09/21  2329   AST 21   ALT 13   BILITOT 0.3   ALKPHOS 50     Troponin: No results for input(s): TROPONINI in the last 72 hours. BNP: No results for input(s): BNP in the last 72 hours. Lipids:   Recent Labs     12/10/21  1310   CHOL 177   HDL 36*     INR: No results for input(s): INR in the last 72 hours. Lactate: No results for input(s): LACTATE in the last 72 hours. ABGs:No results for input(s): PHART, MXW4WDT, PO2ART, QGJ2LPH, BEART, THGBART, Z1EXQBQO, EVY2VNP in the last 72 hours. UA:No results for input(s): NITRITE, COLORU, PHUR, LABCAST, WBCUA, RBCUA, MUCUS, TRICHOMONAS, YEAST, BACTERIA, CLARITYU, SPECGRAV, LEUKOCYTESUR, UROBILINOGEN, BILIRUBINUR, BLOODU, GLUCOSEU, AMORPHOUS in the last 72 hours. Invalid input(s): Yue Becker     IMAGING:  CTA HEAD NECK W CONTRAST   Final Result      CTA Head:   No significant arterial steno-occlusive disease or evidence of aneurysm. CTA Neck:   No significant arterial steno-occlusive disease or evidence of dissection. CT HEAD WO CONTRAST   Final Result      No acute intracranial hemorrhage or mass effect      MRI BRAIN WO CONTRAST    (Results Pending)       Assessment & Plan:    Jim Flores is a 36 y.o. male with PMHx significant for no significant past medical history who presents with acute onset of difficultly walking which developed four days ago. Vertigo:  - Pt presented with sudden onset ataxia, nystagmus and left visual field cut.  - He has no family history of stroke. - Symptoms suggestive of posterior circulation stroke.    - However, CT and CTA of the head no acute intracranial hemorrhage or no significant arterial steno-occlusive disease or evidence of dissection.  - Echo showed normal systolic and diastolic function and bubble study was performed and fails to show evidence of right to left shunting.  - Pt had recent Covid infection which will put him at risk of clotting.  - Agree with MRI. - Will follow up on MRI and make further recommendations. - Lovenox for DVT prophylaxis         Code Status: Full Code  ADULT DIET;  Regular  PPX:Lovenox       This patient will be discussed with attending, Dr. Malcom March MD.    Viviana Bullard MD, PGY- 2  Contact via MyVR  12/10/2021,  6:00 PM

## 2021-12-10 NOTE — CONSULTS
Neurology Consult Note  Reason for Consult: vertigo - central vs. Peripheral   Chief complaint: vertigo   Dr Heide Kocher, MD asked me to see Stephen Rashid in consultation for evaluation of vertigo    History of Present Illness:  Stephen Rashid is a 36 y.o. male who presents with acute onset of difficultly walking which developed four days ago. Associated with this he felt really nauseated and light-headed. He was dropping things on the ground frequently. He went to see the employee health nurse. He vomited on his way home. He was sent to [facility] on Tuesday morning, and was diagnosed with vertigo. He slept all day. His symptoms are constant, but the queasiness comes and goes. The vertigo is unchanged since it began. This has never happened to him before. He denies any diplopia but reports associated symptoms of blurry vision. He tells me if he looks to the right, it doesn't spin. He reports neck pain for the last month in his left posterior neck. No weakness, dysphagia, paresthesias. He denies a history of hypertension. Does not take an antiplatelet at home. He denies a history of hyperlipidemia or diabetes, heart failure, DVT/PE. He smokes occasionally on weekends. He has not been vaccinated for COVID-19. He reports having COVID-19 about a month ago. His mom had a stroke in her 25s. Medical History:  Past Medical History:   Diagnosis Date    Asthma     Nystagmus      History reviewed. No pertinent surgical history.   Medications Prior to Admission:   meclizine (ANTIVERT) 12.5 MG tablet, Take 12.5 mg by mouth 3 times daily as needed  naproxen (NAPROSYN) 500 MG tablet, Take 1 tablet by mouth 2 times daily (with meals)  albuterol sulfate HFA (PROVENTIL HFA) 108 (90 Base) MCG/ACT inhaler, Inhale 2 puffs into the lungs every 4 hours as needed for Wheezing or Shortness of Breath (Space out to every 6 hours as symptoms improve) Space out to every 6 hours as symptoms improve. Allergies   Allergen Reactions    Shellfish-Derived Products Anaphylaxis    Percocet [Oxycodone-Acetaminophen]      History reviewed. No pertinent family history. Social History     Tobacco Use   Smoking Status Current Some Day Smoker    Types: Cigarettes   Smokeless Tobacco Never Used     Social History     Substance and Sexual Activity   Drug Use Never     Social History     Substance and Sexual Activity   Alcohol Use No       ROS:  Constitutional- No weight loss or fevers  Eyes- No diplopia. No photophobia. Ears/nose/throat- No dysphagia. No Dysarthria  Cardiovascular- No palpitations. + chest pain  Respiratory- + dyspnea. No Cough  Gastrointestinal- + Abdominal pain. No Vomiting. Genitourinary- No incontinence. No urinary retention  Musculoskeletal- No myalgia. No arthralgia  Skin- No rash. No easy bruising. Psychiatric- No depression. No anxiety  Endocrine- No diabetes. No thyroid issues. Hematologic- No bleeding difficulty. No fatigue  Neurologic- No weakness. + Headache. Exam:  Blood pressure 117/75, pulse 64, temperature 97.2 °F (36.2 °C), temperature source Oral, resp. rate 16, height 5' 7\" (1.702 m), weight 189 lb (85.7 kg), SpO2 97 %. Constitutional    Vital signs: BP, HR, and RR reviewed   General Alert, no distress, well-nourished  Eyes: unable to visualize fundi   Cardiovascular: pulses symmetric in all 4 extremities. No peripheral edema. Psychiatric: cooperative with examination, no  psychotic behavior noted.   Neurologic  Mental status: eyes open spontaneously   orientation to person and place, month, and year   General fund of knowledge grossly intact; names current president   Memory grossly intact   Attention intact as able to attend well to the exam; unable to read clock given blurry vision    Language fluent in conversation   Comprehension intact; follows simple commands and 2 step commands crossing midline  Cranial nerves:    CN2: LEFT homonymous hemianopsia   CN 3,4,6: copy of this note was provided for Dr Too Castro MD.    Jroge Lorenzana NP  Neurology and Neurocritical care  [facility] Neurology line: [phone]  [employer]: [facility] Neurology & Neurocritical Care NPs    I spent 60 minutes in the care of this patient. Over 50% of that time was in face-to-face counseling regarding disease process, diagnostic testing, preventative measures, and answering patient and family questions.

## 2021-12-10 NOTE — ED TRIAGE NOTES
30Y/O male presents to the ED for vertigo. Pt states that he was seen at [address] [place] [address] for the same and was given Meclizine and Zofran. Pt states minimal relief. Pt also states he has had several falls, forwards and backwards and denies any injury.  +nausea + bilateral eye pain + head pain

## 2021-12-10 NOTE — PROGRESS NOTES
Physical Therapy    Facility/Department: [address]  Initial Assessment    NAME: Mckenzie Pollard  : 1990  MRN: [id number]    Date of Service: 12/10/2021    Discharge Recommendations: Mckenzie Pollard scored a 16/24 on the AM-PAC short mobility form. Current research shows that an AM-PAC score of 18 or greater is typically associated with a discharge to the patient's home setting. Based on the patient's AM-PAC score and their current functional mobility deficits, it is recommended that the patient have 2-3 sessions per week of Physical Therapy at d/c to increase the patient's independence. At this time, this patient demonstrates the endurance and safety to discharge home with OP PT for vertigo and a follow up treatment frequency of 2-3x/wk. Please see assessment section for further patient specific details. Pt symptoms of dizziness are causing instability with gait, if symptoms resolve pt will be safe to return home. If patient discharges prior to next session this note will serve as a discharge summary. Please see below for the latest assessment towards goals. PT Equipment Recommendations  Equipment Needed: No    Assessment   Body structures, Functions, Activity limitations: Decreased functional mobility ; Decreased endurance; Decreased safe awareness; Decreased balance  Assessment: pt is a 37 yo male presenting with vertigo presenting below baseline due to dizziness and double vision, pt is requiring assist of 2 for stand pivot transfers and ambulation due to LOB and unsteady gait. pt experienced 2 LOB with short distance ambulation requiring heavy assist to correct. pt demonstrating significant nystagmus at rest and with eye movements to the L. pt will benefit from OP PT to address vertigo symptoms. will follow throughout stay  Treatment Diagnosis: dec functional mobility  Prognosis: Good  Decision Making: Medium Complexity  PT Education: Goals; PT Role; Plan of Care;  Functional AM-PAC Score  AM-PAC Inpatient Mobility Raw Score : 16 (12/10/21 1322)  AM-PAC Inpatient T-Scale Score : 40.78 (12/10/21 1322)  Mobility Inpatient CMS 0-100% Score: 54.16 (12/10/21 1322)  Mobility Inpatient CMS G-Code Modifier : CK (12/10/21 1322)          Goals  Short term goals  Time Frame for Short term goals: by dc  Short term goal 1: pt will perform functional transfers with indep  Short term goal 2: pt will ambulate 150' without LOB and with indep  Short term goal 3: pt will negotiate 6 steps with 1 HR and Sup  Patient Goals   Patient goals : to go home       Therapy Time   Individual Concurrent Group Co-treatment   Time In 1110         Time Out 1135         Minutes 25              Timed Code Treatment Minutes:  10 min     Total Treatment Minutes:  25 min       Allison Mendoza, PT

## 2021-12-10 NOTE — ED NOTES
Patient got out of bed slowly, dangled legs on bedside. Pt off balance when stood up, swaying when took a few steps.  Placed back on Javier Narayanan, RN  12/10/21 6132

## 2021-12-10 NOTE — ED PROVIDER NOTES
[facility]  EMERGENCY DEPT VISIT      Patient Identification  Mckenzie Pollard is a 36 y.o. male. Chief Complaint   Dizziness      History of Present Illness: This is a  36 y.o. male who presents via ambulance to the ED with complaints of pain is described as a spinning sensation and feels off balance. He states that it came on abruptly Tuesday night. He was at work and started having trouble walking and was stumbling and losing his balance but did not fall. He was seen by the nurse at work and sent to the emergency department. Patient was seen at [facility] ED where blood work and EKG were relatively unremarkable. He was treated for vertigo with Antivert and Zofran. He did not get much relief from this medication but was feeling well enough and ambulating well enough to go home. Patient states the dizziness has waxed and waned since. There was a few hours that he felt like he could walk okay but it got worse again this evening. He tried taking the meclizine at 4pm but felt like the dizziness got worse within 10 minutes of taking it and hs vomiting recurred. He has mild headache mostly behind left eye. He reports some blurred vision at certain distances which is new. No diplopia. No numbness or weakness. No fever. No sinus congestion, sore throat, cough, ear pain, tinnitus. He has been having left sided posterior neck pain for a few months. No trauma to neck. No recent chiropractic manipulation. Past Medical History:   Diagnosis Date    Asthma     Nystagmus        History reviewed. No pertinent surgical history. No current facility-administered medications for this encounter.     Current Outpatient Medications:     meclizine (ANTIVERT) 12.5 MG tablet, Take 12.5 mg by mouth 3 times daily as needed, Disp: , Rfl:     naproxen (NAPROSYN) 500 MG tablet, Take 1 tablet by mouth 2 times daily (with meals), Disp: 30 tablet, Rfl: 0    albuterol sulfate HFA (PROVENTIL HFA) 108 (90 Base) MCG/ACT inhaler, Inhale 2 puffs into the lungs every 4 hours as needed for Wheezing or Shortness of Breath (Space out to every 6 hours as symptoms improve) Space out to every 6 hours as symptoms improve., Disp: 1 Inhaler, Rfl: 0    Allergies   Allergen Reactions    Percocet [Oxycodone-Acetaminophen]        Social History     Socioeconomic History    Marital status:      Spouse name: Not on file    Number of children: Not on file    Years of education: Not on file    Highest education level: Not on file   Occupational History    Not on file   Tobacco Use    Smoking status: Current Some Day Smoker     Types: Cigarettes    Smokeless tobacco: Never Used   Vaping Use    Vaping Use: Never used   Substance and Sexual Activity    Alcohol use: No    Drug use: Never    Sexual activity: Not on file   Other Topics Concern    Not on file   Social History Narrative    Not on file     Social Determinants of Health     Financial Resource Strain:     Difficulty of Paying Living Expenses: Not on file   Food Insecurity:     Worried About Running Out of Food in the Last Year: Not on file    Shanna of Food in the Last Year: Not on file   Transportation Needs:     Lack of Transportation (Medical): Not on file    Lack of Transportation (Non-Medical):  Not on file   Physical Activity:     Days of Exercise per Week: Not on file    Minutes of Exercise per Session: Not on file   Stress:     Feeling of Stress : Not on file   Social Connections:     Frequency of Communication with Friends and Family: Not on file    Frequency of Social Gatherings with Friends and Family: Not on file    Attends Restorationism Services: Not on file    Active Member of Clubs or Organizations: Not on file    Attends Club or Organization Meetings: Not on file    Marital Status: Not on file   Intimate Partner Violence:     Fear of Current or Ex-Partner: Not on file    Emotionally Abused: Not on file    Physically Abused: Not on file   Cherelle Haynes Sexually Abused: Not on file   Housing Stability:     Unable to Pay for Housing in the Last Year: Not on file    Number of [place] in the Last Year: Not on file    Unstable Housing in the Last Year: Not on file       Nursing Notes Reviewed      ROS:  GENERAL:  No fever, no chills, no diaphoresis, no appetite changes  EYES: no eye discharge, no eye redness, no visual changes  ENT: no nasal congestion, no sore throat  CARDIAC: no chest pain, no palpitations, no leg swelling  PULM: no cough, no shortness of breath  ABD: no abdominal pain, + nausea, + vomiting, no diarrhea, no melena or hematochezia  : no dysuria, no hematuria, no urgency, no frequency. No flank pain  MUSCULOSKELETAL: no back pain, no arthralgias, no myalgias  NEURO: + headache, no lightheadedness, + dizziness, no numbness, no weakness, no syncope, no confusion, no speech difficulty  SKIN: no rashes, no erythema, no wounds, no ecchymosis      PHYSICAL EXAM:  GENERAL APPEARANCE: Jay Paz is in no acute respiratory distress. Awake and alert. VITAL SIGNS:   ED Triage Vitals [12/09/21 2231]   Enc Vitals Group      BP (!) 145/78      Pulse 102      Resp 20      Temp 98.2 °F (36.8 °C)      Temp Source Oral      SpO2 100 %      Weight 189 lb (85.7 kg)      Height 5' 7\" (1.702 m)      Head Circumference       Peak Flow       Pain Score       Pain Loc       Pain Edu? Excl. in [address]? HEAD: Normocephalic, atraumatic. EYES:  Extraocular muscles are intact. Pupils equal round and reactive to light. Conjunctivas are pink. Negative scleral icterus. Horizontal nystagmus on lateral gaze worse when looking to left side. TMs with no erythema  ENT:  Mucous membranes are moist.  Pharynx without erythema or exudates. NECK: Nontender and supple. No cervical adenopathy. CHEST:  Clear to auscultation bilaterally. No rales, rhonchi, or wheezing. HEART:  Regular rate and regular rhythm. No murmurs. Strong and equal pulses in the upper and lower extremities. ABDOMEN: Soft,  nondistended, positive bowel sounds. abdomen is nontender. No rebound. no guarding. MUSCULOSKELETAL: The calves are nontender to palpation. Active range of motion of the upper and lower extremities. No edema. NEUROLOGICAL: Awake, alert and oriented x 3. Power intact in the upper and lower extremities. Sensation is intact to light touch in the upper and lower extremities. Cranial Nerves 2-12 are intact. No truncal ataxia. No dysarthria or aphasia. Normal finger to nose. NIH Stroke Scale       1a  Level of consciousness: 0=alert; keenly responsive   1b. LOC questions:  0=Performs both tasks correctly   1c. LOC commands: 0=Performs both tasks correctly   2. Best Gaze: 0=normal   3. Visual: 0=No visual loss   4. Facial Palsy: 0=Normal symmetric movement   5a. Motor left arm: 0=No drift, limb holds 90 (or 45) degrees for full 10 seconds   5b. Motor right arm: 0=No drift, limb holds 90 (or 45) degrees for full 10 seconds   6a. motor left le=No drift, limb holds 90 (or 45) degrees for full 10 seconds   6b  Motor right le=No drift, limb holds 90 (or 45) degrees for full 10 seconds   7. Limb Ataxia: 0=Absent   8. Sensory: 0=Normal; no sensory loss   9. Best Language:  0=No aphasia, normal   10. Dysarthria: 0=Normal   11. Extinction and Inattention: 0=No abnormality   12. Distal motor function: 0=Normal    Total:  0     DERMATOLOGIC: No petechiae, rashes, or ecchymoses. No erythema. PSYCH: normal mood and affect. Normal thought content. ED COURSE AND MEDICAL DECISION MAKING:      EKG as interpreted by myself:  normal sinus rhythm with a rate of 63 with artifact  Axis is   Right axis deviation  QTc is  normal  Incomplete RBBB  No specific ST-T wave changes appreciated. No evidence of acute ischemia. Radiology:  Films have been read by radiologist as noted in chart unless otherwise stated.  Other radiologic studies (i.e. CT, MRI, ultrasounds, etc ) have been interpreted by radiologist.     CTA HEAD NECK W CONTRAST   Final Result      CTA Head:   No significant arterial steno-occlusive disease or evidence of aneurysm. CTA Neck:   No significant arterial steno-occlusive disease or evidence of dissection. CT HEAD WO CONTRAST   Final Result      No acute intracranial hemorrhage or mass effect          Labs:  Results for orders placed or performed during the hospital encounter of 12/09/21   CBC Auto Differential   Result Value Ref Range    WBC 4.6 4.0 - 11.0 K/uL    RBC 5.26 4.20 - 5.90 M/uL    Hemoglobin 15.9 13.5 - 17.5 g/dL    Hematocrit 47.7 40.5 - 52.5 %    MCV 90.7 80.0 - 100.0 fL    MCH 30.2 26.0 - 34.0 pg    MCHC 33.3 31.0 - 36.0 g/dL    RDW 13.2 12.4 - 15.4 %    Platelets 790 718 - 666 K/uL    MPV 8.6 5.0 - 10.5 fL    PLATELET SLIDE REVIEW Adequate     Neutrophils % 37.0 %    Lymphocytes % 44.7 %    Monocytes % 11.6 %    Eosinophils % 5.2 %    Basophils % 1.5 %    Neutrophils Absolute 1.7 1.7 - 7.7 K/uL    Lymphocytes Absolute 2.0 1.0 - 5.1 K/uL    Monocytes Absolute 0.5 0.0 - 1.3 K/uL    Eosinophils Absolute 0.2 0.0 - 0.6 K/uL    Basophils Absolute 0.1 0.0 - 0.2 K/uL   Comprehensive Metabolic Panel   Result Value Ref Range    Sodium 140 136 - 145 mmol/L    Potassium 3.9 3.5 - 5.1 mmol/L    Chloride 101 99 - 110 mmol/L    CO2 29 21 - 32 mmol/L    Anion Gap 10 3 - 16    Glucose 104 (H) 70 - 99 mg/dL    BUN 10 7 - 20 mg/dL    CREATININE 1.1 0.9 - 1.3 mg/dL    GFR Non-African American >60 >60    GFR African American >60 >60    Calcium 10.1 8.3 - 10.6 mg/dL    Total Protein 8.2 6.4 - 8.2 g/dL    Albumin 4.8 3.4 - 5.0 g/dL    Albumin/Globulin Ratio 1.4 1.1 - 2.2    Total Bilirubin 0.3 0.0 - 1.0 mg/dL    Alkaline Phosphatase 50 40 - 129 U/L    ALT 13 10 - 40 U/L    AST 21 15 - 37 U/L       Treatment in the department:  Patient received the following while in the ED.     Medications   LORazepam (ATIVAN) injection 0.5 mg (0.5 mg IntraVENous Given 12/9/21 3744)   ondansetron HOURS OF DIZZINESS DESCRIBED AS SPINNING AND ACCOMPANIED BY NAUSEA AND NYSTAGMUS. HE IS ATAXIC AND VERY UNSTEADY. NOT CODE STROKE PATIENT DUE TO ONSET OVER 24 HOURS. OTHERWISE NORMAL NEURO EXAM. NO H/O PRIOR VERTIGO. DOES NOT SEEM VERY FATIGUABLE. CT AND CTA UNREMARKABLE. NO OBVIOUS POSTERIOR CIRCULATION STROKE OR BLEED AND NO POSTERIOR CIRCULATION LVO OR DISSECTION. NO RELIEF WITH ANTIVERT OR ATIVAN, JUST SEDATION. REMAINS UNABLE TO WALK. ADMIT FOR FURTHER EVAL AND MRI. I spoke with Dr. Tobi Thomson. We thoroughly discussed the history, physical exam, laboratory and imaging studies, as well as, emergency department course. Based upon that discussion, we've decided to admit Kathy Pantoja for further observation and evaluation of Ric Watters's vertigo symptoms. As I have deemed necessary from their history, physical and studies, I have considered and evaluated Suleman Gaines the following diagnoses:  DIABETES, INTRACRANIAL HEMORRHAGE, DISSECTION, BPV,  MENINGITIS, SUBARACHNOID HEMORRHAGE, SUBDURAL HEMATOMA, TIA, and STROKE. Clinical Impression:  1. Vertigo        Dispo:  Patient will be  admitted at this time. Patient was informed of this decision and agrees with plan. I have discussed lab and xray findings with patient and they understand. Questions were answered to the best of my ability. Discharge vitals:  Blood pressure 127/69, pulse 59, temperature 98.2 °F (36.8 °C), temperature source Oral, resp. rate 16, height 5' 7\" (1.702 m), weight 189 lb (85.7 kg), SpO2 95 %. Prescriptions given:   New Prescriptions    No medications on file       This chart was created using Dragon voice recognition software.         Nancy Galeazzi, MD  12/10/21 1947

## 2021-12-11 ENCOUNTER — APPOINTMENT (OUTPATIENT)
Dept: MRI IMAGING | Age: 31
DRG: 066 | End: 2021-12-11
Payer: COMMERCIAL

## 2021-12-11 VITALS
HEART RATE: 60 BPM | BODY MASS INDEX: 29.66 KG/M2 | HEIGHT: 67 IN | TEMPERATURE: 97.8 F | SYSTOLIC BLOOD PRESSURE: 160 MMHG | RESPIRATION RATE: 16 BRPM | DIASTOLIC BLOOD PRESSURE: 98 MMHG | WEIGHT: 189 LBS | OXYGEN SATURATION: 98 %

## 2021-12-11 LAB
ANION GAP SERPL CALCULATED.3IONS-SCNC: 8 MMOL/L (ref 3–16)
BUN BLDV-MCNC: 9 MG/DL (ref 7–20)
CALCIUM SERPL-MCNC: 9.2 MG/DL (ref 8.3–10.6)
CHLORIDE BLD-SCNC: 102 MMOL/L (ref 99–110)
CO2: 30 MMOL/L (ref 21–32)
CREAT SERPL-MCNC: 1 MG/DL (ref 0.9–1.3)
ESTIMATED AVERAGE GLUCOSE: 116.9 MG/DL
GFR AFRICAN AMERICAN: >60
GFR NON-AFRICAN AMERICAN: >60
GLUCOSE BLD-MCNC: 88 MG/DL (ref 70–99)
HBA1C MFR BLD: 5.7 %
POTASSIUM REFLEX MAGNESIUM: 4.1 MMOL/L (ref 3.5–5.1)
SODIUM BLD-SCNC: 140 MMOL/L (ref 136–145)

## 2021-12-11 PROCEDURE — 6370000000 HC RX 637 (ALT 250 FOR IP): Performed by: INTERNAL MEDICINE

## 2021-12-11 PROCEDURE — 96372 THER/PROPH/DIAG INJ SC/IM: CPT

## 2021-12-11 PROCEDURE — 99232 SBSQ HOSP IP/OBS MODERATE 35: CPT | Performed by: NURSE PRACTITIONER

## 2021-12-11 PROCEDURE — G0378 HOSPITAL OBSERVATION PER HR: HCPCS

## 2021-12-11 PROCEDURE — 6360000002 HC RX W HCPCS: Performed by: INTERNAL MEDICINE

## 2021-12-11 PROCEDURE — 36415 COLL VENOUS BLD VENIPUNCTURE: CPT

## 2021-12-11 PROCEDURE — 6370000000 HC RX 637 (ALT 250 FOR IP): Performed by: NURSE PRACTITIONER

## 2021-12-11 PROCEDURE — 80048 BASIC METABOLIC PNL TOTAL CA: CPT

## 2021-12-11 PROCEDURE — 70551 MRI BRAIN STEM W/O DYE: CPT

## 2021-12-11 PROCEDURE — 2580000003 HC RX 258: Performed by: INTERNAL MEDICINE

## 2021-12-11 PROCEDURE — 96376 TX/PRO/DX INJ SAME DRUG ADON: CPT

## 2021-12-11 PROCEDURE — 99254 IP/OBS CNSLTJ NEW/EST MOD 60: CPT | Performed by: PHYSICAL MEDICINE & REHABILITATION

## 2021-12-11 RX ORDER — DIAZEPAM 2 MG/1
2 TABLET ORAL EVERY 8 HOURS PRN
Qty: 9 TABLET | Refills: 0 | Status: SHIPPED | OUTPATIENT
Start: 2021-12-11 | End: 2021-12-14

## 2021-12-11 RX ADMIN — ONDANSETRON 4 MG: 2 INJECTION INTRAMUSCULAR; INTRAVENOUS at 08:32

## 2021-12-11 RX ADMIN — ASPIRIN 325 MG: 325 TABLET, COATED ORAL at 08:32

## 2021-12-11 RX ADMIN — SODIUM CHLORIDE, PRESERVATIVE FREE 5 ML: 5 INJECTION INTRAVENOUS at 08:32

## 2021-12-11 RX ADMIN — ONDANSETRON 4 MG: 4 TABLET, ORALLY DISINTEGRATING ORAL at 15:16

## 2021-12-11 RX ADMIN — ENOXAPARIN SODIUM 40 MG: 100 INJECTION SUBCUTANEOUS at 08:32

## 2021-12-11 ASSESSMENT — PAIN SCALES - GENERAL: PAINLEVEL_OUTOF10: 0

## 2021-12-11 NOTE — PROGRESS NOTES
Neurology Progress Note    Reason for visit: vertigo    Interval history   1) symptoms persist  2) interestingly, MRI is negative for stroke. We discussed the potential for DWI negative stroke. 3) going to pursue rehab     Exam:  -Mental status: Alert, oriented to person, place, month, and year; pleasant & appropriate  -Speech & Language: no aphasia; no dysarthria. Follows simple commands.   -Cranial nerves: left inferior quadrantanopia pupils symmetric; no notable dysconjugate gaze; eyes midline; no facial asymmetry  -Motor: moving all extremities symmetrically and fully  -Coordination: no ataxia with FNF  -Sensory: grossly intact to light touch all four limbs  -Other: no adventitious movements noted  Other Systems  -General Appearance: well-developed, well-nourished, no apparent distress  -Neck: supple  -Lungs: breathing unlabored, regular, no audible wheezes  -CV: pulses strong x4 extremities  -Abd: flat    Neurological ROS: positive for - impaired coordination/balance, visual complaint    negative for - speech problems or weakness     Labs:   mg/dL  A1c 5.7%    Studies:  CTA Head:   No significant arterial steno-occlusive disease or evidence of aneurysm.       CTA Neck:   No significant arterial steno-occlusive disease or evidence of dissection. Normal MRI study of the brain.       Prominent pneumatized lateral recesses of the sphenoid sinuses bilaterally. Mucoid retention cysts seen in these lateral recesses bilaterally. EKG: NSR    Echo:  Left ventricular cavity size is normal. Normal left ventricular wall   thickness. Overall left ventricular systolic function appears normal.   Overall left ventricular systolic function appears normal with an ejection   fraction of 55-60%. No regional wall motion abnormalities are noted. Normal diastolic function. Estimated pulmonary artery systolic pressure is at 25 mmHg assuming a right   atrial pressure of 3 mmHg.    A bubble study was performed

## 2021-12-11 NOTE — PROGRESS NOTES
D/C order noted. SL and tele removed. Pt given detailed follow-up instructions to f/u with neurology-opthamology at [facility], as well as the information for the ARU rehab to f/u with the . Pt states he will be home with family. Pt's mom also at bedside. Transported off unit per wheelchair.

## 2021-12-11 NOTE — CONSULTS
Minutes of Exercise per Session: Not on file   Stress:     Feeling of Stress : Not on file   Social Connections:     Frequency of Communication with Friends and Family: Not on file    Frequency of Social Gatherings with Friends and Family: Not on file    Attends Jain Services: Not on file    Active Member of Clubs or Organizations: Not on file    Attends Club or Organization Meetings: Not on file    Marital Status: Not on file   Intimate Partner Violence:     Fear of Current or Ex-Partner: Not on file    Emotionally Abused: Not on file    Physically Abused: Not on file    Sexually Abused: Not on file   Housing Stability:     Unable to Pay for Housing in the Last Year: Not on file    [facility]: Not on file    Unstable Housing in the Last Year: Not on file           REVIEW OF SYSTEMS:   CONSTITUTIONAL: negative for fevers, chills, diaphoresis, appetite change, night sweats, unexpected weight change, fatigue- positive for dizziness  EYES: negative for blurred vision, eye discharge, visual disturbance and icterus  HEENT: negative for hearing loss, tinnitus, ear drainage, sinus pressure, nasal congestion, epistaxis and snoring  RESPIRATORY: Negative for hemoptysis, cough, sputum production  CARDIOVASCULAR: negative for chest pain, palpitations, exertional chest pressure/discomfort, syncope, edema  GASTROINTESTINAL: negative for nausea, vomiting, diarrhea, blood in stool, abdominal pain, constipation  GENITOURINARY: negative for frequency, dysuria, urinary incontinence, decreased urine volume, and hematuria  HEMATOLOGIC/LYMPHATIC: negative for easy bruising, bleeding and lymphadenopathy  ALLERGIC/IMMUNOLOGIC: negative for recurrent infections, angioedema, anaphylaxis and drug reactions  ENDOCRINE: negative for weight changes and diabetic symptoms including polyuria, polydipsia and polyphagia  MUSCULOSKELETAL: negative for pain, joint swelling, decreased range of motion  NEUROLOGICAL: negative for headaches, slurred speech, unilateral weakness  PSYCHIATRIC/BEHAVIORAL: negative for hallucinations, behavioral problems, confusion and agitation. Physical Examination:  Vitals:   Patient Vitals for the past 24 hrs:   BP Temp Temp src Pulse Resp SpO2   12/11/21 0838 (!) 160/98 97.8 °F (36.6 °C) Oral 60 16 98 %   12/11/21 0550 124/67 98 °F (36.7 °C) Oral 58 18 96 %   12/11/21 0130 111/60 98.1 °F (36.7 °C) Oral 63 18 93 %   12/10/21 2132 (!) 147/75 98.1 °F (36.7 °C) Oral 58 18 96 %   12/10/21 1500 122/81 98.5 °F (36.9 °C) Oral 67 18 95 %     Const: Alert. WDWN. No distress  Eyes: Conjunctiva noninjected, no icterus noted; pupils equal, round, and reactive to light. HENT: Atraumatic, normocephalic; Oral mucosa moist  Neck: Trachea midline, neck supple. No thyromegaly noted. CV: Regular rate and rhythm, no murmur rub or gallop noted  Resp: Lungs clear to auscultation bilaterally, no rales wheezes or ronchi, no retractions. Respirations unlabored. GI: Soft, nontender, nondistended. Normal bowel sounds. No palpable masses. Skin: Normal temperature and turgor. No rashes or breakdown noted. Ext: No significant edema appreciated. No varicosities. MSK: No joint tenderness, erythema, warmth noted. AROM intact. Neuro: Alert, oriented, appropriate. No cranial nerve deficits appreciated. Sensation intact to light touch. Motor examination reveals normal strength in all four limbs diffusely. No abnormalities with finger/nose or heel/shin noted. Reflexes normal and symmetric.   Psych: Stable mood, normal judgement, normal affect     Lab Results   Component Value Date    WBC 4.6 12/09/2021    HGB 15.9 12/09/2021    HCT 47.7 12/09/2021    MCV 90.7 12/09/2021     12/09/2021     No results found for: INR, PROTIME  Lab Results   Component Value Date    CREATININE 1.1 12/09/2021    BUN 10 12/09/2021     12/09/2021    K 3.9 12/09/2021     12/09/2021    CO2 29 12/09/2021 Lab Results   Component Value Date    ALT 13 12/09/2021    AST 21 12/09/2021    ALKPHOS 50 12/09/2021    BILITOT 0.3 12/09/2021           MRI BRAIN WO CONTRAST   Final Result      Normal MRI study of the brain. Prominent pneumatized lateral recesses of the sphenoid sinuses bilaterally. Mucoid retention cysts seen in these lateral recesses bilaterally. CTA HEAD NECK W CONTRAST   Final Result      CTA Head:   No significant arterial steno-occlusive disease or evidence of aneurysm. CTA Neck:   No significant arterial steno-occlusive disease or evidence of dissection. CT HEAD WO CONTRAST   Final Result      No acute intracranial hemorrhage or mass effect            Assessment:  1. Vertigo- Likely associated with posterior circulation pathology  - MRI- Prominent pneumatized lateral recesses of the sphenoid sinuses bilaterally. Mucoid retention cysts seen in these lateral recesses bilaterally. - Echo showed normal systolic and diastolic function and bubble study was performed and fails to show evidence of right to left shunting.  - neurology following  - requires PT/OT  - Smoking cessation      2. Past covid infection- improved- possible hypercoagulable state        Impairments- Decreased functional mobility, Decreased ADLs    Recommendations: He would like to come to the ARU if able to communicate with his employer [employer]). Patient is doing well in therapy. Will recommend ARU and therapy for the patient as he would greatly benefit. Gait dysfunction/balance therapy required. Thank you for this consult. Please contact me with any questions or concerns.      Mouna Tran D.O. M.P.H  PM&R  12/11/2021  9:33 AM

## 2021-12-11 NOTE — DISCHARGE SUMMARY
Hospital Medicine Discharge Summary    Patient ID: Roxy Potter      Patient's PCP: Maria Del Rosario Agrawal MD    Admit Date: 12/9/2021     Discharge Date:   12/11/2021    Admitting Physician: Cleo Aguirre MD     Discharge Physician: Jens Ramos MD     Discharge Diagnoses: Active Hospital Problems    Diagnosis     Vertigo [R42]      The patient was seen and examined on day of discharge and this discharge summary is in conjunction with any daily progress note from day of discharge. Hospital Course:   Vertigo:  Presented w/ vertigo and left homonymous hemianopsia. Though the MRI is unrevealing but does not entirely exclude stroke. Vessel imaging unremarkable. Neuro consulted and also felt that his vertigo especially given his visual field diefect was concerning for an acute cva. Pt started on full dose asa and will f/u neuro and neuroophthalmologic on dc. Referra l given for vestibular rehab. Sx did not improve w/ meclizine but benzo helped. Neuro recommended dc on low dose valium. Appreciate neuro help. Physical Exam Performed:     BP (!) 160/98   Pulse 60   Temp 97.8 °F (36.6 °C) (Oral)   Resp 16   Ht 5' 7\" (1.702 m)   Wt 189 lb (85.7 kg)   SpO2 98%   BMI 29.60 kg/m²       General appearance:  No apparent distress  HEENT:  Normal cephalic, atraumatic without obvious deformity. Extra ocular muscles intact. Neck: Supple, with full range of motion. Respiratory:  Normal respiratory effort. Cardiovascular:  Regular rate and rhythm   Abdomen: Soft, non-tender, non-distended with normal bowel sounds. Musculoskeletal:  No clubbing, cyanosis or edema bilaterally. Skin: Skin color, texture, turgor normal.    Neurologic:  Neurovascularly intact without any focal sensory/motor deficits. Psychiatric:  Alert and oriented, thought content appropriate, normal insight  Capillary Refill: Brisk,< 3 seconds   Peripheral Pulses: +2 palpable, equal bilaterally     Labs:  For convenience and continuity at follow-up the following most recent labs are provided:    CBC:    Lab Results   Component Value Date    WBC 4.6 12/09/2021    HGB 15.9 12/09/2021    HCT 47.7 12/09/2021     12/09/2021       Renal:    Lab Results   Component Value Date     12/11/2021    K 4.1 12/11/2021     12/11/2021    CO2 30 12/11/2021    BUN 9 12/11/2021    CREATININE 1.0 12/11/2021    CALCIUM 9.2 12/11/2021         Significant Diagnostic Studies    Radiology:   MRI BRAIN WO CONTRAST   Final Result      Normal MRI study of the brain. Prominent pneumatized lateral recesses of the sphenoid sinuses bilaterally. Mucoid retention cysts seen in these lateral recesses bilaterally. CTA HEAD NECK W CONTRAST   Final Result      CTA Head:   No significant arterial steno-occlusive disease or evidence of aneurysm. CTA Neck:   No significant arterial steno-occlusive disease or evidence of dissection. CT HEAD WO CONTRAST   Final Result      No acute intracranial hemorrhage or mass effect             Consults:     IP CONSULT TO NEUROLOGY  IP CONSULT TO HEMATOLOGY  IP CONSULT TO PHYSICAL MEDICINE REHAB    Disposition:  Home     Condition at Discharge: Stable    Discharge Instructions/Follow-up:  Neuro and neuro-ophthalmology     Code Status:  Full Code     Activity: activity as tolerated    Diet: regular diet      Discharge Medications:     Current Discharge Medication List           Details   aspirin 325 MG EC tablet Take 1 tablet by mouth daily  Qty: 30 tablet, Refills: 3      diazePAM (VALIUM) 2 MG tablet Take 1 tablet by mouth every 8 hours as needed (dizziness) for up to 3 days.   Qty: 9 tablet, Refills: 0    Associated Diagnoses: Central positional vertigo              Details   albuterol sulfate HFA (PROVENTIL HFA) 108 (90 Base) MCG/ACT inhaler Inhale 2 puffs into the lungs every 4 hours as needed for Wheezing or Shortness of Breath (Space out to every 6 hours as symptoms improve) Space out to

## 2021-12-13 ENCOUNTER — CARE COORDINATION (OUTPATIENT)
Dept: CASE MANAGEMENT | Age: 31
End: 2021-12-13

## 2021-12-13 LAB — REPORT: NORMAL

## 2021-12-14 LAB
EKG ATRIAL RATE: 63 BPM
EKG DIAGNOSIS: NORMAL
EKG P AXIS: 62 DEGREES
EKG P-R INTERVAL: 184 MS
EKG Q-T INTERVAL: 410 MS
EKG QRS DURATION: 112 MS
EKG QTC CALCULATION (BAZETT): 419 MS
EKG R AXIS: 94 DEGREES
EKG T AXIS: 56 DEGREES
EKG VENTRICULAR RATE: 63 BPM

## 2021-12-16 ENCOUNTER — OFFICE VISIT (OUTPATIENT)
Dept: ORTHOPEDIC SURGERY | Age: 31
End: 2021-12-16
Payer: COMMERCIAL

## 2021-12-16 VITALS — HEIGHT: 67 IN | BODY MASS INDEX: 29.66 KG/M2 | WEIGHT: 189 LBS

## 2021-12-16 DIAGNOSIS — R42 VERTIGO: Primary | ICD-10-CM

## 2021-12-16 PROCEDURE — 99204 OFFICE O/P NEW MOD 45 MIN: CPT | Performed by: PHYSICAL MEDICINE & REHABILITATION

## 2021-12-16 PROCEDURE — G8428 CUR MEDS NOT DOCUMENT: HCPCS | Performed by: PHYSICAL MEDICINE & REHABILITATION

## 2021-12-16 PROCEDURE — G8484 FLU IMMUNIZE NO ADMIN: HCPCS | Performed by: PHYSICAL MEDICINE & REHABILITATION

## 2021-12-16 PROCEDURE — 1111F DSCHRG MED/CURRENT MED MERGE: CPT | Performed by: PHYSICAL MEDICINE & REHABILITATION

## 2021-12-16 PROCEDURE — 4004F PT TOBACCO SCREEN RCVD TLK: CPT | Performed by: PHYSICAL MEDICINE & REHABILITATION

## 2021-12-16 PROCEDURE — G8419 CALC BMI OUT NRM PARAM NOF/U: HCPCS | Performed by: PHYSICAL MEDICINE & REHABILITATION

## 2021-12-16 RX ORDER — MECLIZINE HYDROCHLORIDE 25 MG/1
25 TABLET ORAL 2 TIMES DAILY PRN
Qty: 60 TABLET | Refills: 0 | Status: SHIPPED | OUTPATIENT
Start: 2021-12-16 | End: 2022-01-15

## 2021-12-16 NOTE — PROGRESS NOTES
[facility]) Physical Medicine and Rehabilitation   Outpatient Progress Note  Tate Deleon. DO Laure, MPH    Patient Name: Francisco Javier Ortiz MRN: <[id number]>   Age: 36 y.o. YOB: 1990   Sex: male      [address] Complaint   Patient presents with    Other     patient experiencing vertigo since last Tuesday, admitted to [place] [address] Manuela Campbell is a 36 y.o. male with a past medical history of asthma who was recently hospitalized for vertigo. Mri of the brain is normal with mucoid retention cysts in the lateral recess bilaterally. Since this event he has had trouble with vertigo and balance. When he looks to his right the room will spin but looking to the left has no effect. He has noticed some improvement with therapy. At home he is not taking any medication other than ASA. He did not take prescribed valium. He has not going to any vestibular rehab and does not know where to go for outpatient therapy. Today he is following up from hospital consult. PAST MEDICAL HISTORY      Past Medical History:   Diagnosis Date    Asthma     Nystagmus        PAST SURGICAL HISTORY   No past surgical history on file. MEDICATIONS     Current Outpatient Medications   Medication Sig Dispense Refill    aspirin 325 MG EC tablet Take 1 tablet by mouth daily 30 tablet 3    albuterol sulfate HFA (PROVENTIL HFA) 108 (90 Base) MCG/ACT inhaler Inhale 2 puffs into the lungs every 4 hours as needed for Wheezing or Shortness of Breath (Space out to every 6 hours as symptoms improve) Space out to every 6 hours as symptoms improve. 1 Inhaler 0     No current facility-administered medications for this visit. ALLERGIES     Allergies   Allergen Reactions    Shellfish-Derived Products Anaphylaxis    Percocet [Oxycodone-Acetaminophen]        FAMILY HISTORY   No family history on file.     SOCIAL HISTORY     Social History     Socioeconomic History    Marital status:  Spouse name: Not on file    Number of children: Not on file    Years of education: Not on file    Highest education level: Not on file   Occupational History    Not on file   Tobacco Use    Smoking status: Current Some Day Smoker     Types: Cigarettes    Smokeless tobacco: Never Used   Vaping Use    Vaping Use: Never used   Substance and Sexual Activity    Alcohol use: No    Drug use: Never    Sexual activity: Not on file   Other Topics Concern    Not on file   Social History Narrative    Not on file     Social Determinants of Health     Financial Resource Strain:     Difficulty of Paying Living Expenses: Not on file   Food Insecurity:     Worried About Running Out of Food in the Last Year: Not on file    Shanna of Food in the Last Year: Not on file   Transportation Needs:     Lack of Transportation (Medical): Not on file    Lack of Transportation (Non-Medical):  Not on file   Physical Activity:     Days of Exercise per Week: Not on file    Minutes of Exercise per Session: Not on file   Stress:     Feeling of Stress : Not on file   Social Connections:     Frequency of Communication with Friends and Family: Not on file    Frequency of Social Gatherings with Friends and Family: Not on file    Attends Spiritism Services: Not on file    Active Member of [address] [place] [address] [employer] or Organizations: Not on file    Attends Club or Organization Meetings: Not on file    Marital Status: Not on file   Intimate Partner Violence:     Fear of Current or Ex-Partner: Not on file    Emotionally Abused: Not on file    Physically Abused: Not on file    Sexually Abused: Not on file   Housing Stability:     Unable to Pay for Housing in the Last Year: Not on file    Number of [place] in the Last Year: Not on file    Unstable Housing in the Last Year: Not on file       REVIEW OF SYSTEMS   General: no fever, chills, night sweats, anorexia, malaise, fatigue, or weight change- positive for vertigo  Hematologic:  no unexplained bleeding or bruising  HEENT:   no nasal congestion, rhinorrhea, sore throat, or facial pain  Respiratory:  no cough, dyspnea, or chest pain  Cardiovascular:  no angina, LUGO, PND, orthopnea, dependent edema, or palpitations  Gastrointestinal:  no nausea, vomiting, diarrhea, constipation, or abdominal pain  Genitourinary:  no urinary urgency, frequency, dysuria, or hematuria  Musculoskeletal: see HPI  Endocrine:  no heat or cold intolerance and no polyphagia, polydipsia, or polyuria  Skin:  no skin eruptions or changing lesions  Neurologic:  no focal weakness, numbness/tingling, tremor, or severe headache. See HPI. See HPI for pertinent positives. PHYSICAL EXAM   Vital Signs:   Vitals:    12/16/21 1251   Weight: 189 lb (85.7 kg)   Height: 5' 7\" (1.702 m)       General appearance: healthy, alert, no distress  Skin: Skin color, texture, turgor normal. No rashes or lesions  HEENT: atraumatic, normocephalic. PERRL- nystagmus bilateral    Respiratory: Unlabored breathing  Lymphatic: No adenopathy   Neuro: Alert and oriented, normal distal sensation, normal bilateral DTRs- balance  Vascular: Normal distal capillary and distal pulses  Muskuloskeletal Exam: 5/5 strength      IMPRESSION     1. Vertigo         PLAN   I had a lengthy discussion with patient today regarding diagnosis and treatment options and recommendations. 1. Start Physical therapy/ vestibular therapy 2x a week at [facility]    2. He would like to stop valium- retry Antivert- will start at 25mg BID    3. Follow up with neurology     FOLLOWUP     No follow-ups on file. Orders Placed This Encounter   Procedures    [facility]     Referral Priority:   Routine     Referral Type:   Eval and Treat     Referral Reason:   Specialty Services Required     Requested Specialty:   Physical Therapy     Number of Visits Requested:   1      No orders of the defined types were placed in this encounter.       Patient was instructed on appropriate use of braces, participation in home exercise programs, healthy lifestyle choices and weight loss as appropriate     Mark Kelsey, DO

## 2021-12-23 ENCOUNTER — HOSPITAL ENCOUNTER (OUTPATIENT)
Dept: PHYSICAL THERAPY | Age: 31
Setting detail: THERAPIES SERIES
Discharge: HOME | End: 2021-12-23
Payer: COMMERCIAL

## 2021-12-23 PROCEDURE — 97530 THERAPEUTIC ACTIVITIES: CPT

## 2021-12-23 PROCEDURE — 95992 CANALITH REPOSITIONING PROC: CPT

## 2021-12-23 PROCEDURE — 97112 NEUROMUSCULAR REEDUCATION: CPT

## 2021-12-23 PROCEDURE — 97163 PT EVAL HIGH COMPLEX 45 MIN: CPT

## 2021-12-23 NOTE — PLAN OF CARE
[facility]patient [facility]  [address], [place] [address], [address]  Phone: [phone]   Fax: [phone]                                                       Physical Therapy Certification    Dear Referring Practitioner: Dr. Carmen Colbert,    We had the pleasure of evaluating the following patient for physical therapy services at [facility] ([facility]). A summary of our findings can be found in the initial assessment below. This includes our plan of care. If you have any questions or concerns regarding these findings, please do not hesitate to contact me at the office phone number checked above. Thank you for the referral.       Physician Signature:_______________________________Date:__________________  By signing above (or electronic signature), therapist's plan is approved by physician      Patient: Dorina Peters   : 1990   MRN: [id number]   Referring Physician: Referring Practitioner: Dr. Carmen Colbert       Evaluation Date: 2021       Medical Diagnosis Information:   Diagnosis: O49 (ICD-10-CM) - Vertigo             Treatment diagnosis: dizziness w/ head movement, impaired dynamic balance                                    Insurance information: PT Insurance Information: [facility]     Precautions/ Contra-indications: none  Latex Allergy:  NO        Preferred Language for Healthcare:   English       other:     Time in:   1400      Time out: 1515 Total Treatment Time:  55  ______________________________________________________________________    SUBJECTIVE:      Referral Date: 21  Onset Date: 21     Chief Complaint: currently pt c/o not being able to walk a straight path, bend over and look up loses balance. Pt reports all dizziness/vertigo is gone, but does still have difficulty when first gets up and starts to move fast because forgets that he has trouble and gets off balance.  When walking he feels like he is getting pushed to the R or sometimes back to the BETY  Pt reports all symptoms are improving daily. Just saw PCP 1 hour ago, BP was normal. She recommended no further meds. Pt has follow up w/ neurology at [facility] on Jan. 14.     Setting in which symptoms first occurred: started while walking at work, stumbled to the right. Few minutes later stumbled more, 1 hour later got very nauseous. 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 Went to see RN at work, reportedly she saw nystagmus, pt fell asleep and upon waking up he fell. Then he vomitted and RN sent him to ED. He was still slightly dizzy next evening and progressive got worse. Came to [facility] ED on 12/9/21, they did more extensive work up, admitted for 2 days. Imaging was inconclusive for acute pathology. Pt preferred to go home vs ARU, saw Dr. Familia Orr on 12/16/21 as an outpatient and he recommended vestibular PT. Of note,pt had covid19 around 10/19/21  (pt does not recall all symptoms but reports he was very sick and probably should have gone to the hospital). He also endorses \"brain fog\" for the about the past year. Description of symptoms: [] Vertigo [x]  Off-balance [] Lightheadedness  Scale: 2/10  Symptoms are getting:  [x] Better [] Worse  [] Same [] Episodic  Description of Spells:  [] Constant [] Spontaneous [] Induced by motion   [x] Induced by position changes [x] Other: attempting to move fast or reach forward and outside DYLAN  Length of time spells occur: [] Seconds [] Minutes  [] Hours [] Days [] Other:   What increases symptoms? antivert  What decreases symptoms?      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 No   2) Have you actually had any thoughts of killing yourself? No   6) Have you ever done anything, started to do anything, or prepared to do anything to end your life? No         Co-morbidities/Complexities (which will affect course of rehabilitation):   []None           Arthritic conditions   []Rheumatoid arthritis (M05.9)  []Osteoarthritis (M19.91)   Cardiovascular conditions   []Hypertension (I10)  []Hyperlipidemia (E78.5)  []Angina pectoris (I20)  []Atherosclerosis (I70)   Musculoskeletal conditions   []Disc pathology   []Congenital spine pathologies   []Prior surgical intervention  []Osteoporosis (M81.8)  []Osteopenia (M85.8)   Endocrine conditions   []Hypothyroid (E03.9)  []Hyperthyroid Gastrointestinal conditions   []Constipation (R41.03)   Metabolic conditions   []Morbid obesity (E66.01)  []Diabetes type 1(E10.65) or 2 (E11.65)   []Neuropathy (G60.9)     Pulmonary conditions   [x]Asthma (J45)  []Coughing   []COPD (J44.9)   Psychological Disorders  []Anxiety (F41.9)  []Depression (F32.9)   []Other:   [x]Other: [id number] around 10/19/21  (pt does not recall all symptoms but reports he was very sick and probably should have gone to the hospital)       OBJECTIVE:   BP L UE sitting:    Musculoskeletal Screen  Cervical spine complaints: pain for past 2 months  Cervical Pain: L posterior base of neck   Cervical spine ROM:  []  WNL [x] Impaired: B rotation and sidebending limited by posterior L pain.  No dizziness     LE ROM:    LEFT RIGHT Hip      Knee      Ankle       LE Strength:   LEFT RIGHT    Hip flexion      Hip IR      Hip ER      Knee extension      Knee flexion      Ankle       Posture:  Rounded shoulders, forward head. Palpation:    Somatosensory:  Light touch:  [x] Normal [] Impaired Comments:    Coordination:  Rapid alternating movements: [x] Normal [] Dysdiadokinesia   Finger to Nose:   [x] Normal [] Dysmetric  Heel to Shin:    [x] Normal [] Ataxic  Alt toe taps:  Slightly decreased coordination of L foot w/ increased speed      Postural Control Tests:  Clinical Test of Sensory Interaction for Balance (CTSIB) performed in Romberg stance  CONDITION TIME STRATEGY SWAY    Eyes open, firm surface 30      Eyes closed, firm surface 30      Eyes open, foam surface 30      Eyes closed, foam surface 30 ankle Mult direction, near LOB at 5 sec     Tandem stance:     Gait: impaired, bumps into doorframe on L, guarded lacking trunk and head rotation, unsteady   Assistive Device:      Orthosis: At self-initiated pace:  Elvia: slow   DYLAN: wide        Arm swing: decreased, arms in low guard  Head/trunk rotation: nearly absent      Straight path: challenging to maintain  Swerves: observed swerve to L and hit door frame. Swerve to R at end of session after standing up.        Staggers: yes  Side-steps:    Gait speed:     Oculomotor/Vestibular Examination:    Spontaneous nystagmus:  [] Left  [] Right [x] Absent  Gaze-Evoked nystagmus with fixation present:   Primary [] Present [x] Absent   Right  [] Present [x] Absent   Left  [] Present [x] Absent  VOR Head Thrust Test: [] Normal [x] Abnormal  Comments: head to R, eyes L-3-4 beats of nystagmus (corrective saccades)   VOR Cancellation:  [] Normal [x] Abnormal Comments: very slight catch up saccades w/ head to R   Smooth Pursuit:  [x] Normal [] Abnormal Comments:   Saccades:   [x] Normal [] Abnormal Comments:   Convergence:   [] Normal [] Abnormal Comments:   Static Visual Acuity:    Dynamic Visual Acuity:      Positional Testing  Test of provocation: [x] Negative  [] Positive    Positional Testing  R Hallpike-Allison maneuver:    Nystagmus:  [x] Yes  [] No  [x] Duration: 30-35      [x] Direction: Slow drift more than typical nystagmus, appeared horizontal to R or slightly downward but unable to determine direction   Vertigo:  [x] Yes  [] No  [] Duration:  Fatigable ~ 30-35 sec  L Hallpike-Allison maneuver:   Nystagmus:  [] Yes  [x] No  [] Duration:       [] Direction:    Vertigo:  [] Yes  [x] No  [] Duration:   Supine roll head right:   Nystagmus:  [] Yes  [x] No  [] Duration:       [] Direction:    Vertigo:  [] Yes  [x] No  [] Duration: slightly felt jeannie going to start but did not  Supine roll head left:   Nystagmus:  [] Yes  [x] No  [] Duration:       [] Direction:    Vertigo:  [] Yes  [x] No  [] Duration:     Outcome Measures  Dizziness Handicap Index (DHI)    42%  Functional Gait Assessment (FGA)    TBA  Terrell Balance Scale        Timed Get \"Up and Go\"       5 time sit-to-stand        Activities Specific Balance Confidence Scale (ABC)  63%  Motion Sensitivity Quotient (MSQ)      Visual Vertigo Analogue Scale (VVAS)       [x] Patient history, allergies, meds reviewed. Medical chart reviewed. See intake form. Review Of Systems (ROS):  [x]Performed Review of systems (Integumentary, CardioPulmonary, Neurological) by intake and observation. Intake form has been scanned into medical record. Patient has been instructed to contact their primary care physician regarding ROS issues if not already being addressed at this time.             Barriers to/and or personal factors that will affect rehab potential:             []Age  []Sex    [x]Smoker              []Motivation/Lack of Motivation                        []Co-Morbidities              []Cognitive Function, education/learning barriers              []Environmental, home barriers              [x]profession/work barriers  [x]past PT/medical experience  []other:  Justification:     Falls Risk Assessment (30 days):   [] Falls Risk assessed and no intervention required. [x] Falls Risk assessed and Patient requires intervention due to being higher risk   [] TUG score (>12s at risk):  [x] Falls education provided, including avoiding provoking positions until symptoms improve. ASSESSMENT: Pt is relatively healthy 36 y.o. male who presents w/ c/o difficulty walking, frequently losing balance, dizziness w/ head rotation. The pt's initial presentation to the ED was L gaze holding nystagmus and vertigo. Pt's work up was negative for acute neurologic disease. His medical history is positive for Covid19 in 10/2021 when pt reports he was very sick and does not recall all symptoms but thinks he probably should have gone to the hospital. He now reports that his vertigo symptoms are improving daily. He denies nystagmus or constant dizziness. He does lose his balance easily especially when first wakes up his vision is blurry and he loses his balance when begins to move after sitting or sleeping. He feels that he is being pushed forward and to the R when walking and sometimes w/ head movement feels he is being pushed back and to the L. His PT evaluation findings include an abnormal VOR thrust test consistent w/ a peripheral lesion. His positional testing was inclonclusive although the R erik-hallpike most closely reproduced his original symptoms of vertigo, but the nystagmus was not typical vertical or torsional, was very slow and inconsistent beating somewhat R horizontal. Upon retesting with the R loaded erik-hallpike it was negative while completing the R eply maneuver. The pt's symptoms today more closely resemble a R vestibular neuritis and he was issued gaze x 1 exercises to address abnormal VOR. He will benefit from further dynamic balance testing due to very guarded posture w/ abnormal gait and mult LOB observed.   Pt will continue to benefit from skilled PT to reduce fall risk and improve independence w/ functional mobility.        Functional Impairments:     [x] BPPV    [x] Right [x] Posteror Canal [x] Canalithiasis (inconclusive)    [] Left  [] Horizontal Canal [] Cupulolithiasis   []Decreased Gaze Stabilization   []Increased Motion Sensitivity   [x]Unilateral Vestibular Hypofunction   []Bilateral Vestibular Hypofunction   [x]Gait Instability   []Decreased tolerance for ADLs       []Decreased functional strength    [x]Reduced Balance/Proprioceptive control     []Reduced ability to hear/focus    []Noted cervical/thoracic/GHJ joint hypomobility    []Noted cervical/thoracic/GHJ joint hypermobility    [x]Decreased cervical/UE functional ROM    []Noted Headache pain aggravated by neck movements with/without dizziness    []Abnormal reflexes/sensation/myotomal/dermatomal deficits    []Decreased DCF control or ability to hold head up    []Decreased RC/scapular/core strength and neuromuscular control    []Other:     Functional Activity Limitations (from functional questionnaire and intake)   []Reduced ability to tolerate prolonged functional positions   [x]Reduced ability or difficulty with changes of positions or transfers between positions    []Reduced ability to transfer in/out of bed or rolling in bed    [x]Reduced ability or tolerance with driving, reading and/or computer work    [x]Reduced ability to perform lifting, reaching, carrying tasks    [x]Reduced ability to forward bend   [x]Reduced ability to ambulate prolonged functional periods/distances/surfaces    [x]Reduced ability to ascend/descend stairs    []Reduced ability to concentrate/focus    [x]Reduced ability to turn/pitch head rapidly    []Reduced ability to self-correct for losses of balance   []Other:        Participation Restrictions    []Reduced participation in self care activities    [x]Reduced participation in home management activities    [x]Reduced participation in work activities    [x]Reduced participation in social activities    [x]Reduced participation in sport/recreational activities    Classification :    [x]Signs/symptoms consistent with BPPV (benign paroxysmal positional vertigo)       [x]Signs/symptoms consistent with unilateral peripheral vestibulopathy (i.e., vestibular neuritis, labyrinthitis, acoustic neuroma)     []Signs/symptoms consistent with central vestibulopathy     []Signs/symptoms consistent with migraine-related vestibulopathy    []Signs/symptoms consistent with Menieres disease / post-traumatic hydrops    []Signs/symptoms consistent with perilymphatic fistula    []Signs/symptoms consistent with cervicogenic dizziness    [x]Signs/symptoms consistent with gait instability    []Signs/symptoms consistent with motion sensitivity      []Signs/symptoms consistent with neck pain with mobility deficits      []Signs/symptoms consistent with neck pain with movement coordinated impairments     []Signs/symptoms consistent with neck pain with radiating pain     []Signs/symptoms consistent with neck pain with headaches (cervicogenic)     []Signs/symptoms consistent with nerve root involvement including myotome & dermatome dysfunction    []Signs/symptoms consistent with facet dysfunction of cervical and thoracic spine     []Signs/symptoms consistent suggesting central cord compression/UMN syndromes    []Signs/symptoms consistent with discogenic cervical pain    []Signs/symptoms consistent with rib dysfunction    []Signs/symptoms consistent with postural dysfunction    []Signs/symptoms consistent with shoulder pathology     []Signs/symptoms consistent with post-surgical status including decreased ROM, strength and function.     []Signs/symptoms which may limit the use of advanced manual therapy techniques: (Elevated CV risk profile, recent trauma, intolerance to end range positions, prior TIA, visual issues, UE neurological compromise)    []Other:      Prognosis/Rehab Potential:      []Excellent [x]Good    []Fair    []Poor    Tolerance of evaluation/treatment:     [x]Excellent    []Good    []Fair    []Poor     Physical Therapy Evaluation Complexity Justification   [x] A history of present problem with:  [] no personal factors and/or comorbidities that impact the plan of care;  []1-2 personal factors and/or comorbidities that impact the plan of care  [x]3 personal factors and/or comorbidities that impact the plan of care  [x] An examination of body systems using standardized tests and measures addressing any of the following: body structures and functions (impairments), activity limitations, and/or participation restrictions;:  [] a total of 1-2 or more elements   [] a total of 3 or more elements   [x] a total of 4 or more elements   [x] A clinical presentation with:  [] stable and/or uncomplicated characteristics   [] evolving clinical presentation with changing characteristics  [x] unstable and unpredictable characteristics;   [x] Clinical decision making of high  complexity using standardized patient assessment instrument and/or measurable assessment of functional outcome.      []EVAL (LOW) 60422 (typically 20 minutes face-to-face)   []EVAL (MOD) 70823 (typically 30 minutes face-to-face)   [x]EVAL (HIGH) 34924 (typically 45 minutes face-to-face)   []RE-EVAL    PLAN:   Today's Treatment:    [x] See flowsheet   [x] Patient treated with canalith repositioning maneuver   [x] Education materials provided on BPPV/Vestibular Dysfunction   [x] Precautions provided and patient to follow precautions for next 24 hours in regards to BPPV management   [x] Written home exercise instructions   [x] Other: pt educated and performed gaze x 1 in sitting, horizontal     Frequency/Duration:  1-2 days per week for 4 Weeks:  Interventions:   [x]Therapeutic exercise including: strength training and ROM for Upper extremity, Lower extremity, spine, and Core    [x]NMR activation and proprioception for BLEs, vestibular training, balance, and coordination    [x]Manual therapy as indicated for UE, LE and spine to include: Dry Needling/IASTM, STM, PROM, Gr I-IV mobilizations, manipulation. [x]Vestibular rehabilitation to include canalith repositioning maneuvers, gaze stabilization, and habituation as indicated   [x]Gait training   []WC training   [x]Home Management training of patient and/or caregiver   []Modalities as needed that may include: thermal agents, E-stim, Biofeedback, US, iontophoresis as indicated   [x]Patient education on BPPV/vestibular function, balance, postural re-education, activity modification, progression of HEP. []Other:    GOALS: Goals established 12/23/21   Patient stated goal: improve walking and head range of motion  [] Progressing: [] Met: [] Not Met: [] Adjusted    Therapist goals for Patient:  Short Term Goals: To be achieved in: 2 weeks  1. Independent in HEP and progression per patient tolerance, in order to prevent injury. [] Progressing: [] Met: [] Not Met: [] Adjusted  2. Patient will have a decrease in dizziness/imbalance/symptoms to  to facility improvement in movement, function, balance, and ADLS as indicated by Functional Deficits. [] Progressing: [] Met: [] Not Met: [] Adjusted    Long Term Goals: To be achieved in:  4 weeks  1. Disability index score of 14% or less for the [address] [place] [address] to assist with reaching prior level of function  [] Progressing: [] Met: [] Not Met: [] Adjusted  2. Patient will demonstrate increased cervical AROM to WNL to allow for proper joint functioning and ability to safely ambulate. [] Progressing: [] Met: [] Not Met: [] Adjusted  3. Pt will score >=27/30 on FGA for low fall risk. [] Progressing: [] Met: [] Not Met: [] Adjusted  4. Patient will return to functional activities including walking and bending over forward without increased symptoms or restriction. [] Progressing: [] Met: [] Not Met: [] Adjusted  5.  Pt will demonstrate normal VOR thrust test for improved gaze stabilization.    [] Progressing: [] Met: [] Not Met: [] Adjusted      Electronically signed by:  Deborah Villalpando, PT, DPT

## 2021-12-23 NOTE — FLOWSHEET NOTE
[facility] Outpatient Therapy  [address], [address], [address]  Phone: [phone]   Fax: [phone]    Physical Therapy Treatment Note/ Progress Report:     Date:  2021    Patient Name:  Shan Chung    :  1990  MRN: [id number]    Medical/Treatment Diagnosis Information:  · Diagnosis: R42 (ICD-10-CM) - Vertigo  ·   Treatment diagnosis: dizziness w/ head movement, impaired dynamic balance   Insurance/Certification information:  PT Insurance Information: [facility]  Physician Information:  Referring Practitioner: Dr. Narciso Obrien of care signed:    [] Yes  [x] No    Date of Patient follow up with Physician: Neurology      Progress Report: []  Yes  [x]  No     Date Range for reporting period:  Beginnin21  Ending:      Progress report due (10 Rx/or 30 days whichever is less):      Recertification due (POC duration/ or 90 days whichever is less):      Visit # Insurance Allowable Auth Needed    100 PT/OT/ST combined - BMN []Yes   [x]No     RESTRICTIONS/PRECAUTIONS: none  Latex Allergy:  [x]NO      []YES  Preferred Language for Healthcare:   [x]English       []other:  Functional Scale: DHI 42%; ABC scale 63%  Date assessed:21    Pain level:  0-1/10 L posterior neck      SUBJECTIVE:  See eval    OBJECTIVE: See eval   Observation: gait wide DYLAN, reduced cervical rotation, UE guarded, eulalia slow, veering to L or R at times. Pt able to self correct.  Test measurements:  Complete FGA next    Exercises/Interventions: Exercises in bold performed in department today. Items not bolded are carried forward from prior visits for continuity of the record.     Exercise/Equipment Resistance/Repetitions HEP Other comments   Gaze x 1 30 sec, 50-60bpm  [x] sitting     []    CRT R eply [] No symptoms during or after     []      []      []      []      []      []      []      []      []      []      []      []      []      []      [] Home Exercise Program:  12/23/21: gaze x 1, sitting, 30 sec, 3x/day, progress 60bpm to 120bpm    Therapeutic Exercise:   [] (44194) Provided verbal/tactile cueing for activities related to strengthening, flexibility, endurance, ROM for improvements in LE, proximal hip, and core control with self-care, mobility, lifting, ambulation. NMR:  [x] (50197) Provided verbal/tactile cueing for activities related to improving balance, coordination, kinesthetic sense, posture, motor skill, proprioception to assist with LE, proximal hip, and core control in self-care, mobility, lifting, ambulation and eccentric single leg control. Therapeutic Activities:    [x] (75699) Provided verbal/tactile cueing for activities related to improving balance, coordination, kinesthetic sense, posture, motor skill, proprioception and motor activation to allow for proper function of core, proximal hip and LE with self-care and ADLs and functional mobility. Gait Training:    [] (58173) Provided training and instruction to the patient for proper LE, core and proximal hip recruitment and positioning and eccentric body weight control with ambulation re-education including up and down stairs     Manual Treatments:  PROM / STM / Oscillations-Mobs:  G-I, II, III, IV (PA's, Inf., Post.)  [] (81133) Provided manual therapy to mobilize LE, proximal hip and/or LS spine soft tissue/joints for the purpose of modulating pain, promoting relaxation,  increasing ROM, reducing/eliminating soft tissue swelling/inflammation/restriction, improving soft tissue extensibility and allowing for proper ROM for normal function with self-care, mobility, lifting and ambulation.      Home Exercise Program:    [] (34824) Reviewed/Progressed HEP activities related to strengthening, flexibility, endurance, ROM of core, proximal hip and LE for functional self-care, mobility, lifting and ambulation/stair navigation   [] (72181) Reviewed/Progressed HEP activities related to improving balance, coordination, kinesthetic sense, posture, motor skill, proprioception of core, proximal hip and LE for self-care, mobility, lifting, and ambulation/stair navigation      Modalities:    [] Electric Stimulation:   [] Ultrasound:   [] Other:       Charges:  Timed Code Treatment Minutes: Dennis Sergio; TA 15; Total Treatment Minutes: 55      [] EVAL (LOW) 72865 (typically 20 minutes face-to-face)  [] EVAL (MOD) 56432 (typically 30 minutes face-to-face)  [x] EVAL (HIGH) 86468 (typically 45 minutes face-to-face)  [] RE-EVAL     [] OR(52057) x       [x] NMR (81820) x  2     [] Manual (81889) x       [x] TA (49659) x   2    [] Gait Training (95778) x       [] ES(attended) (81901)  [] ES (un) (68911)   [] DRY NEEDLE 1 OR 2 MUSCLES  [] DRY NEEDLE 3+ MUSCLES  [] Mech Traction (21512)  [] Ultrasound (46013)  [x] Other: CRT x 1    GOALS: Goals established 12/23/21   Patient stated goal: improve walking and head range of motion  [] Progressing: [] Met: [] Not Met: [] Adjusted    Therapist goals for Patient:  Short Term Goals: To be achieved in: 2 weeks  1. Independent in HEP and progression per patient tolerance, in order to prevent injury. [] Progressing: [] Met: [] Not Met: [] Adjusted  2. Patient will have a decrease in dizziness/imbalance/symptoms to  to facility improvement in movement, function, balance, and ADLS as indicated by Functional Deficits. [] Progressing: [] Met: [] Not Met: [] Adjusted    Long Term Goals: To be achieved in:  4 weeks  1. Disability index score of 14% or less for the [facility] to assist with reaching prior level of function  [] Progressing: [] Met: [] Not Met: [] Adjusted  2. Patient will demonstrate increased cervical AROM to WNL to allow for proper joint functioning and ability to safely ambulate. [] Progressing: [] Met: [] Not Met: [] Adjusted  3. Pt will score >=27/30 on FGA for low fall risk. [] Progressing: [] Met: [] Not Met: [] Adjusted  4.  Patient will return to functional activities including walking and bending over forward without increased symptoms or restriction. [] Progressing: [] Met: [] Not Met: [] Adjusted  5. Pt will demonstrate normal VOR thrust test for improved gaze stabilization. [] Progressing: [] Met: [] Not Met: [] Adjusted    ASSESSMENT:  See eval      Treatment/Activity Tolerance:  [x] Patient tolerated treatment well [] Patient limited by fatique  [] Patient limited by pain  [] Patient limited by other medical complications  [] Other:     Overall Progression Towards Functional goals/ Treatment Progress Update:  [] Patient is progressing as expected towards functional goals listed. [] Progression is slowed due to complexities/Impairments listed. [] Progression has been slowed due to co-morbidities. [x] Plan just implemented, too soon to assess goals progression <30days   [] Goals require adjustment due to lack of progress  [] Patient is not progressing as expected and requires additional follow up with physician  [] Other    Prognosis for POC: [x] Good [] Fair  [] Poor    Patient requires continued skilled intervention: [x] Yes  [] No        PLAN: 2x/week for 4 weeks (pt only able to schedule 1x per week therefor will extend to 8 weeks if needed)   [] Continue per plan of care [] Alter current plan (see comments)  [x] Plan of care initiated [] Hold pending MD visit [] Discharge    Electronically signed by: Melanie Gonzalez, PT, DPT    Note: If patient does not return for scheduled/recommended follow up visits, this note will serve as a discharge from care along with the most recent update on progress.

## 2021-12-29 ENCOUNTER — CARE COORDINATION (OUTPATIENT)
Dept: CASE MANAGEMENT | Age: 31
End: 2021-12-29

## 2021-12-29 NOTE — CARE COORDINATION
[facility] Transitions Follow Up Call    2021    Patient: Ryland Baeza  Patient : 1990   MRN: [id number]   Reason for Admission:  covid 19  Discharge Date: 21 RARS: Readmission Risk Score: 3.3 ( )         Spoke with: [address] Transitions Subsequent and Final Call    Subsequent and Final Calls  Do you have any ongoing symptoms?: Yes  Patient-reported symptoms: Other  Have your medications changed?: No  Do you have any questions related to your medications?: No  Do you currently have any active services?: No  Do you have any needs or concerns that I can assist you with?: No  Care Transitions Interventions  Other Interventions:         SUMMARY  CTN spoke to the Pt who reports vertigo continues and that he is doing therapy once a week. Pt denies any other issues. Pt denies issues with appetite, urination, and bowel habits. Pt takes no daily meds. PCP seen on  per Pt. From [facility]: Are you at higher risk for severe illness?  Wash your hands often.  Avoid close contact (6 feet, which is about two arm lengths) with people who are sick.  Put distance between yourself and other people if COVID-19 is spreading in your community.  Clean and disinfect frequently touched surfaces.  Avoid all cruise travel and non-essential air travel.  Call your healthcare professional if you have concerns about COVID-19 and your underlying condition or if you are sick. Pt will take all meds as prescribed and schedule / keep doctor's appt. CTC provided education on s/s that require medical attention and when to seek medical attention. [facility] advised Pt of use urgent care or physician's 24 hr access line if assistance is needed after hours or on the weekend. Pt denies any needs or concerns and episode closed.        Follow Up  Future Appointments   Date Time Provider Danna Pedraza   2022  1:00 PM Jesús PT STEPHIE OP PT [facility] MULLEN   2022  9:00 AM Loni Vo Ivan, PT [facility] OP PT [facility] HOD   1/20/2022  9:00 AM Cordell Sluder, PT [facility] OP PT [facility]OD   1/27/2022  1:00 PM Cordell Sluder, PT [facility] OP PT [facility] HOD   2/3/2022  1:00 PM Cordell Sluder, PT [facility] OP PT [facility] HOD   2/10/2022  1:00 PM Cordell Sluder, PT [facility] OP PT [facility] HOD   2/17/2022  1:00 PM Cordell Sluder, PT [facility] OP PT [facility] HOD       Benjie Yanes RN

## 2022-01-06 ENCOUNTER — HOSPITAL ENCOUNTER (OUTPATIENT)
Dept: PHYSICAL THERAPY | Age: 32
Setting detail: THERAPIES SERIES
Discharge: HOME | End: 2022-01-06
Payer: COMMERCIAL

## 2022-01-06 NOTE — CARE COORDINATION
[facility] Outpatient Therapy  [address] [place] [address], [address]  Phone: [phone]   Fax: [phone]    Physical Therapy Missed Visit Note     Date:  2022    Patient Name:  Giorgio Whiteside      :  1990    MRN: [id number]      Cancelled visits to date: 0  No-shows to date: 1- 22    For today's appointment patient:  []  Cancelled  []  Rescheduled appointment  [x]  No-show     Reason given by patient:  []  Patient ill  []  Conflicting appointment  []  No transportation    []  Conflict with work  []  No reason given  [x]  Other:     Comments:  Called pt, he thought all appts were on . Reviewed future schedule and gave clinic phone number in case he needs to reschedule.      Electronically signed by:  Georgiana Wood, PT, DPT

## 2022-01-13 ENCOUNTER — HOSPITAL ENCOUNTER (OUTPATIENT)
Dept: PHYSICAL THERAPY | Age: 32
Setting detail: THERAPIES SERIES
Discharge: HOME | End: 2022-01-13
Payer: COMMERCIAL

## 2022-01-13 PROCEDURE — 97112 NEUROMUSCULAR REEDUCATION: CPT

## 2022-01-13 NOTE — FLOWSHEET NOTE
[facility] Outpatient Therapy  [address] [place] [address], [address], [address]  Phone: [phone]   Fax: [phone]    Physical Therapy Treatment Note/ Progress Report:     Date:  2022    Patient Name:  Angelia Chapa    :  1990  MRN: [id number]    Medical/Treatment Diagnosis Information:  · Diagnosis: R42 (ICD-10-CM) - Vertigo  ·   Treatment diagnosis: dizziness w/ head movement, impaired dynamic balance   Insurance/Certification information:  PT Insurance Information: [facility]  Physician Information:  Referring Practitioner: Dr. Hernández Goods of care signed:    [] Yes  [x] No (re-routed 22)  Beginnin21  Ending:      Progress report due (10 Rx/or 30 days whichever is less):      Recertification due (POC duration/ or 90 days whichever is less):      Visit # Insurance Allowable Auth Needed    100 PT/OT/ST combined - BMN []Yes   [x]No     RESTRICTIONS/PRECAUTIONS: none  Latex Allergy:  [x]NO      []YES  Preferred Language for Healthcare:   [x]English       []other:  Functional Scale: DHI 42%; ABC scale 63%  Date assessed:21    Pain level:  0-1/10 L posterior neck      SUBJECTIVE: Pt reports he was doing gaze x 1 HEP and was improving until eye pain started on 22. It was in B lateral eyes, had significantly worse light sensitivity and pain w/ movement of eyes in any direction. At the same time started having stomach pains, inability to eat enough to get full which then caused headaches. He went to urgent care, but no cause found. He notes he has lost weight (ring falling off). He noticed some imbalance when had eye pain. All symptoms are better now, but not completely back to normal. Hasn't done gaze x1 since before 22. Denies dizziness. Balance seems better overall. OBJECTIVE: Pt no showed for last appt and 30 min. Late today due to having the wrong appt times written down. Reprinted schedule w/ clinic phone number.  Observation: WFL, no veering or LOB. Significantly improved compared to eval.    Test measurements:    FGA: 24/30 (veering R w/ vertical head turns and eyes closed; veering L w/ horizontal head turns)    R Hallpike-Boston maneuver:               Nystagmus:     []? Yes             [x]? No               []? Duration:                                             []? Direction:                  Vertigo:            []? Yes             [x]? No               []? Duration:       Exercises/Interventions: Exercises in bold performed in department today. Items not bolded are carried forward from prior visits for continuity of the record. Exercise/Equipment Resistance/Repetitions HEP Other comments   Gaze x 1 30 sec,horizontal-  60bpm x 1  90bpm x 2 limited by R eye \"drag\" when head going L to R    Vertical- 90bpm x1, 98 x 1, 100bpm, no limitations   [x] Sitting  VC for neutral cervical posture  Reviewed instructions to progress on HEP, focus on horizontal     []    CRT R eply [] No symptoms during or after     []      []      []      []      []      []      []      []      []      []      []      []      []      []      []      Home Exercise Program:  12/23/21: gaze x 1, sitting, 30 sec, 3x/day, progress 60bpm to 120bpm  1/13/22: Progressed gaze x 1 horizontal to 90bpm, vertical to 100bpm    Therapeutic Exercise:   [] (78573) Provided verbal/tactile cueing for activities related to strengthening, flexibility, endurance, ROM for improvements in LE, proximal hip, and core control with self-care, mobility, lifting, ambulation. NMR:  [x] (54940) Provided verbal/tactile cueing for activities related to improving balance, coordination, kinesthetic sense, posture, motor skill, proprioception to assist with LE, proximal hip, and core control in self-care, mobility, lifting, ambulation and eccentric single leg control.      Therapeutic Activities:    [] (78985) Provided verbal/tactile cueing for activities related to improving balance, coordination, kinesthetic sense, posture, motor skill, proprioception and motor activation to allow for proper function of core, proximal hip and LE with self-care and ADLs and functional mobility. Gait Training:    [] (90898) Provided training and instruction to the patient for proper LE, core and proximal hip recruitment and positioning and eccentric body weight control with ambulation re-education including up and down stairs     Manual Treatments:  PROM / STM / Oscillations-Mobs:  G-I, II, III, IV (PA's, Inf., Post.)  [] (19999) Provided manual therapy to mobilize LE, proximal hip and/or LS spine soft tissue/joints for the purpose of modulating pain, promoting relaxation,  increasing ROM, reducing/eliminating soft tissue swelling/inflammation/restriction, improving soft tissue extensibility and allowing for proper ROM for normal function with self-care, mobility, lifting and ambulation.      Home Exercise Program:    [] (62129) Reviewed/Progressed HEP activities related to strengthening, flexibility, endurance, ROM of core, proximal hip and LE for functional self-care, mobility, lifting and ambulation/stair navigation   [] (24919) Reviewed/Progressed HEP activities related to improving balance, coordination, kinesthetic sense, posture, motor skill, proprioception of core, proximal hip and LE for self-care, mobility, lifting, and ambulation/stair navigation      Modalities:    [] Electric Stimulation:   [] Ultrasound:   [] Other:       Charges:  Timed Code Treatment Minutes: NM 40    Total Treatment Minutes: 40      [] EVAL (LOW) 16510 (typically 20 minutes face-to-face)  [] EVAL (MOD) 05424 (typically 30 minutes face-to-face)  [] EVAL (HIGH) 28354 (typically 45 minutes face-to-face)  [] RE-EVAL     [] FC(18112) x       [x] NMR (52460) x 3     [] Manual (14841) x       [] TA (22600) x     [] Gait Training ([id number]) x       [] ES(attended) (95023)  [] ES (un) ([id number])   [] DRY NEEDLE 1 OR 2 MUSCLES  [] DRY NEEDLE 3+ MUSCLES  [] [facility] Traction (21765)  [] Ultrasound (43673)  [] Other:     GOALS: Goals established 12/23/21   Patient stated goal: improve walking and head range of motion  [] Progressing: [] Met: [] Not Met: [] Adjusted    Therapist goals for Patient:  Short Term Goals: To be achieved in: 2 weeks  1. Independent in HEP and progression per patient tolerance, in order to prevent injury. [] Progressing: [] Met: [] Not Met: [] Adjusted  2. Patient will have a decrease in dizziness/imbalance/symptoms to  to facility improvement in movement, function, balance, and ADLS as indicated by Functional Deficits. [] Progressing: [] Met: [] Not Met: [] Adjusted    Long Term Goals: To be achieved in:  4 weeks  1. Disability index score of 14% or less for the [facility] to assist with reaching prior level of function  [] Progressing: [] Met: [] Not Met: [] Adjusted  2. Patient will demonstrate increased cervical AROM to WNL to allow for proper joint functioning and ability to safely ambulate. [] Progressing: [] Met: [] Not Met: [] Adjusted  3. Pt will score >=27/30 on FGA for low fall risk. [] Progressing: [] Met: [] Not Met: [] Adjusted  4. Patient will return to functional activities including walking and bending over forward without increased symptoms or restriction. [] Progressing: [] Met: [] Not Met: [] Adjusted  5. Pt will demonstrate normal VOR thrust test for improved gaze stabilization. [] Progressing: [] Met: [] Not Met: [] Adjusted    ASSESSMENT:  Pt w/ overall improvement in balance compared to eval. Re-evaluated for R BPPV, erik hallpike was negative today and pt denies vertigo or dizziness. Dynamic balance assessed w/ FGA today. Deficits found w/ head turning and amb w/ eyes closed, some difficulty w/ tandem gait, but able to achieve max score despite being very unsteady. Pt was progressing w/ gaze x 1 HEP until had increased light sensitivity and inability to eat.  He tolerated it well today and able to progress horizontal gaze to a speed of 90bpm, vertical to 100bpm. Pt instructed to continue progression as able. If light sensitivity or inability to eat worsens, recommended that pt call PCP immediately. Pt's neurology follow up rescheduled for Feb. Pt will continue to benefit from skilled PT to improve functional mobility, reduce fall risk and maximize independence. Treatment/Activity Tolerance:  [x] Patient tolerated treatment well [] Patient limited by fatique  [] Patient limited by pain  [] Patient limited by other medical complications  [] Other:     Overall Progression Towards Functional goals/ Treatment Progress Update:  [] Patient is progressing as expected towards functional goals listed. [] Progression is slowed due to complexities/Impairments listed. [] Progression has been slowed due to co-morbidities. [x] Plan just implemented, too soon to assess goals progression <30days   [] Goals require adjustment due to lack of progress  [] Patient is not progressing as expected and requires additional follow up with physician  [] Other    Prognosis for POC: [x] Good [] Fair  [] Poor    Patient requires continued skilled intervention: [x] Yes  [] No        PLAN: 2x/week for 4 weeks (pt only able to schedule 1x per week therefor will extend to 8 weeks if needed)   [x] Continue per plan of care [] Alter current plan (see comments)  [] Plan of care initiated [] Hold pending MD visit [] Discharge    Electronically signed by: Janeth Caruso, PT, DPT    Note: If patient does not return for scheduled/recommended follow up visits, this note will serve as a discharge from care along with the most recent update on progress.

## 2022-01-17 ENCOUNTER — TELEPHONE (OUTPATIENT)
Dept: ORTHOPEDIC SURGERY | Age: 32
End: 2022-01-17

## 2022-01-19 NOTE — TELEPHONE ENCOUNTER
General Question     Subject: PATIENT STATES THAT [employer] HAS NOT RECEIVED HIS RETURN TO WORK NOTES , WORK FAX # [phone], PATIENT WOULD LIKE A CALL BACK ONCE THIS HAS BEEN FAXED BACK  Patient and /or Facility Request: PATIENT  Contact Number: [phone]

## 2022-01-20 ENCOUNTER — HOSPITAL ENCOUNTER (OUTPATIENT)
Dept: PHYSICAL THERAPY | Age: 32
Setting detail: THERAPIES SERIES
Discharge: HOME | End: 2022-01-20
Payer: COMMERCIAL

## 2022-01-20 NOTE — TELEPHONE ENCOUNTER
Spoke to patient. He had sent his paperwork to the wrong fax number. I gave him [facility] fax number and he will resend the papers to be filled out. Off work from 12/09/21- 01/20/22.

## 2022-01-20 NOTE — CARE COORDINATION
[facility] Outpatient [facility]  [address] [place] [address] [address], [address]  Phone: [phone]   Fax: [phone]    Physical Therapy Missed Visit Note     Date:  2022    Patient Name:  Tonya Quintanilla      :  1990    MRN: [id number]      Cancelled visits to date: - 22  No-shows to date: - 22    For today's appointment patient:  [x]  Cancelled  []  Rescheduled appointment  []  No-show     Reason given by patient:  []  Patient ill  []  Conflicting appointment  []  No transportation    []  Conflict with work  []  No reason given  []  Other:     Comments:  Same day cx, could not make appt time.    Electronically signed by:  Jamil Luis, PT, DPT

## 2022-01-27 ENCOUNTER — HOSPITAL ENCOUNTER (OUTPATIENT)
Dept: PHYSICAL THERAPY | Age: 32
Setting detail: THERAPIES SERIES
Discharge: HOME | End: 2022-01-27
Payer: COMMERCIAL

## 2022-01-27 NOTE — CARE COORDINATION
[facility] Out[facility]  [address], [address], [address]  Phone: [phone]   Fax: [phone]    Physical Therapy Missed Visit Note     Date:  2022    Patient Name:  Edis Simon      :  1990    MRN: [id number]      Cancelled visits to date: 1- 22  No-shows to date: 2- 22, 22    For today's appointment patient:  []  Cancelled  []  Rescheduled appointment  [x]  No-show     Reason given by patient:  []  Patient ill  []  Conflicting appointment  []  No transportation    []  Conflict with work  []  No reason given  []  Other:     Comments: Called pt, he stated he didn't have transportation. Counseled on appt policy. Taking off schedule until pt able to set up reliable transportation. He can then call back to schedule an appt at that time if needed.    Electronically signed by:  Natasha Rendon, PT, DPT

## 2024-01-04 NOTE — PLAN OF CARE
Follow Up Visit Note       Active Problems      1. Perianal abscess          Chief Complaint   Chief Complaint   Patient presents with    [facility]     EP - 2 week F/U I&D Perianal abscess 12/13, painful, drainage, bleeding, swelling, having issues walking and sitting, no other symptoms.         History of Present Illness    The patient presents for continued follow-up after incision and drainage of perianal abscess.  The patient has been improving but he states he is still experiencing occasional drainage bleeding swelling and discomfort from the incision.  The patient demonstrated photograph which she took of the area and to \"raw\" areas which caused some concern for recurrence.  Explained to the patient that these areas represent hypertrophic granulation tissue and that we would chemically cauterize those areas in the office today.    Care plan was discussed with the patient at length in the presence of his spouse.  All care management questions were answered to the patient's understanding.    Allergies  Drew has No Known Allergies.    Past Medical / Surgical / Social / Family History    The past medical and past surgical history have been reviewed by me today.    History reviewed. No pertinent past medical history.  History reviewed. No pertinent surgical history.    The family history and social history have been reviewed by me today.    Family History   Problem Relation Age of Onset    Diabetes Mother     Infectious Disease Mother      Social History     Socioeconomic History    Marital status:    Tobacco Use    Smoking status: Never     Passive exposure: Never    Smokeless tobacco: Never   Substance and Sexual Activity    Alcohol use: Yes     Alcohol/week: 5.0 standard drinks of alcohol     Types: 2 Glasses of wine, 2 Cans of beer, 1 Shots of liquor per week    Drug use: Never   Other Topics Concern    Caffeine Concern No    Exercise No    Seat Belt No    Special Diet No    Stress Concern No     Increase functional independence level to baseline status. Weight Concern No        Current Outpatient Medications:     telmisartan 40 MG Oral Tab, Take 1 tablet (40 mg total) by mouth daily., Disp: , Rfl:     ergocalciferol 1.25 MG (02562 UT) Oral Cap, Take 1 capsule (50,000 Units total) by mouth once a week., Disp: , Rfl:     telmisartan 40 MG Oral Tab, Take 1 tablet (40 mg total) by mouth daily., Disp: 90 tablet, Rfl: 3     Review of Systems  The Review of Systems has been reviewed by me during today.  Review of Systems   Constitutional: Negative.    HENT: Negative.     Eyes: Negative.    Respiratory: Negative.     Cardiovascular: Negative.    Gastrointestinal: Negative.    Genitourinary: Negative.    Musculoskeletal: Negative.    Skin: Negative.    Neurological: Negative.    Psychiatric/Behavioral: Negative.          Physical Findings   Pulse 74   Temp 97.2 °F (36.2 °C) (Temporal)   Physical Exam  Constitutional:       Appearance: He is well-developed.   HENT:      Head: Normocephalic and atraumatic.   Eyes:      General: No scleral icterus.  Neck:      Trachea: No tracheal deviation.   Genitourinary:     Prostate: Not enlarged and not tender.      Rectum: No mass, tenderness, anal fissure, external hemorrhoid or internal hemorrhoid. Normal anal tone.          Comments: Anal Sphincter Intact  No Pruritis Ani  No Lichenification  No Abscess  No Fistula in ano  No Anterior Fissure  No Posterior Fissure    Skin:     General: Skin is warm and dry.   Neurological:      Mental Status: He is alert and oriented to person, place, and time.   Psychiatric:         Speech: Speech normal.         Behavior: Behavior normal. Behavior is cooperative.         Thought Content: Thought content normal.         Judgment: Judgment normal.          Assessment   1. Perianal abscess        Plan     The patient incision and drainage site continues to heal well.  No active infection or abscess now.  Two discrete areas of hypertrophic granulation tissue were chemically cauterized with silver  nitrate in the office today.  Anticipated wound healing trajectory and wound management recommendations discussed.  Follow-up in 2 weeks for continued wound surveillance.  The patient was provided ample opportunity to ask questions.  All of the patient's questions were answered in detail.  The patient voiced understanding of the care plan.     No orders of the defined types were placed in this encounter.      Imaging & Referrals   None    Follow Up  No follow-ups on file.    Luis Felipe Puga MD